# Patient Record
Sex: MALE | Race: WHITE | Employment: UNEMPLOYED | URBAN - METROPOLITAN AREA
[De-identification: names, ages, dates, MRNs, and addresses within clinical notes are randomized per-mention and may not be internally consistent; named-entity substitution may affect disease eponyms.]

---

## 2020-06-29 ENCOUNTER — APPOINTMENT (OUTPATIENT)
Dept: CT IMAGING | Age: 50
End: 2020-06-29
Payer: OTHER GOVERNMENT

## 2020-06-29 ENCOUNTER — HOSPITAL ENCOUNTER (EMERGENCY)
Age: 50
Discharge: HOME | End: 2020-06-29
Payer: OTHER GOVERNMENT

## 2020-06-29 VITALS
TEMPERATURE: 99.3 F | SYSTOLIC BLOOD PRESSURE: 130 MMHG | WEIGHT: 205 LBS | RESPIRATION RATE: 16 BRPM | OXYGEN SATURATION: 97 % | BODY MASS INDEX: 29.35 KG/M2 | DIASTOLIC BLOOD PRESSURE: 89 MMHG | HEART RATE: 78 BPM | HEIGHT: 70 IN

## 2020-06-29 LAB
ALBUMIN SERPL-MCNC: 4.2 G/DL (ref 3.5–4.6)
ALP BLD-CCNC: 88 U/L (ref 35–104)
ALT SERPL-CCNC: 62 U/L (ref 0–41)
ANION GAP SERPL CALCULATED.3IONS-SCNC: 12 MEQ/L (ref 9–15)
AST SERPL-CCNC: 40 U/L (ref 0–40)
BACTERIA: NEGATIVE /HPF
BASOPHILS ABSOLUTE: 0.1 K/UL (ref 0–0.2)
BASOPHILS RELATIVE PERCENT: 0.5 %
BILIRUB SERPL-MCNC: 1.4 MG/DL (ref 0.2–0.7)
BILIRUBIN URINE: NEGATIVE
BLOOD, URINE: ABNORMAL
BUN BLDV-MCNC: 7 MG/DL (ref 6–20)
CALCIUM SERPL-MCNC: 9.4 MG/DL (ref 8.5–9.9)
CHLORIDE BLD-SCNC: 102 MEQ/L (ref 95–107)
CLARITY: CLEAR
CO2: 24 MEQ/L (ref 20–31)
COLOR: YELLOW
CREAT SERPL-MCNC: 0.79 MG/DL (ref 0.7–1.2)
EOSINOPHILS ABSOLUTE: 0.1 K/UL (ref 0–0.7)
EOSINOPHILS RELATIVE PERCENT: 0.4 %
EPITHELIAL CELLS, UA: ABNORMAL /HPF (ref 0–5)
GFR AFRICAN AMERICAN: >60
GFR NON-AFRICAN AMERICAN: >60
GLOBULIN: 2.6 G/DL (ref 2.3–3.5)
GLUCOSE BLD-MCNC: 135 MG/DL (ref 70–99)
GLUCOSE URINE: NEGATIVE MG/DL
HCT VFR BLD CALC: 46.9 % (ref 42–52)
HEMOGLOBIN: 16.3 G/DL (ref 14–18)
HYALINE CASTS: ABNORMAL /HPF (ref 0–5)
KETONES, URINE: NEGATIVE MG/DL
LACTIC ACID: 2.2 MMOL/L (ref 0.5–2.2)
LEUKOCYTE ESTERASE, URINE: NEGATIVE
LIPASE: 19 U/L (ref 12–95)
LYMPHOCYTES ABSOLUTE: 1.7 K/UL (ref 1–4.8)
LYMPHOCYTES RELATIVE PERCENT: 12.8 %
MCH RBC QN AUTO: 31.4 PG (ref 27–31.3)
MCHC RBC AUTO-ENTMCNC: 34.8 % (ref 33–37)
MCV RBC AUTO: 90.3 FL (ref 80–100)
MONOCYTES ABSOLUTE: 0.9 K/UL (ref 0.2–0.8)
MONOCYTES RELATIVE PERCENT: 6.6 %
NEUTROPHILS ABSOLUTE: 10.5 K/UL (ref 1.4–6.5)
NEUTROPHILS RELATIVE PERCENT: 79.7 %
NITRITE, URINE: NEGATIVE
PDW BLD-RTO: 12.5 % (ref 11.5–14.5)
PH UA: 6.5 (ref 5–9)
PLATELET # BLD: 220 K/UL (ref 130–400)
POC CREATININE WHOLE BLOOD: 0.9
POTASSIUM SERPL-SCNC: 3.5 MEQ/L (ref 3.4–4.9)
PROTEIN UA: NEGATIVE MG/DL
RBC # BLD: 5.19 M/UL (ref 4.7–6.1)
RBC UA: ABNORMAL /HPF (ref 0–5)
SODIUM BLD-SCNC: 138 MEQ/L (ref 135–144)
SPECIFIC GRAVITY UA: 1.01 (ref 1–1.03)
TOTAL PROTEIN: 6.8 G/DL (ref 6.3–8)
URINE REFLEX TO CULTURE: ABNORMAL
UROBILINOGEN, URINE: 0.2 E.U./DL
WBC # BLD: 13.1 K/UL (ref 4.8–10.8)
WBC UA: ABNORMAL /HPF (ref 0–5)

## 2020-06-29 PROCEDURE — 96374 THER/PROPH/DIAG INJ IV PUSH: CPT

## 2020-06-29 PROCEDURE — 81001 URINALYSIS AUTO W/SCOPE: CPT

## 2020-06-29 PROCEDURE — 80053 COMPREHEN METABOLIC PANEL: CPT

## 2020-06-29 PROCEDURE — 96375 TX/PRO/DX INJ NEW DRUG ADDON: CPT

## 2020-06-29 PROCEDURE — 2580000003 HC RX 258: Performed by: PHYSICIAN ASSISTANT

## 2020-06-29 PROCEDURE — 6360000004 HC RX CONTRAST MEDICATION: Performed by: PHYSICIAN ASSISTANT

## 2020-06-29 PROCEDURE — 83690 ASSAY OF LIPASE: CPT

## 2020-06-29 PROCEDURE — 6360000002 HC RX W HCPCS: Performed by: PHYSICIAN ASSISTANT

## 2020-06-29 PROCEDURE — 6370000000 HC RX 637 (ALT 250 FOR IP): Performed by: PHYSICIAN ASSISTANT

## 2020-06-29 PROCEDURE — 85025 COMPLETE CBC W/AUTO DIFF WBC: CPT

## 2020-06-29 PROCEDURE — 36415 COLL VENOUS BLD VENIPUNCTURE: CPT

## 2020-06-29 PROCEDURE — 99284 EMERGENCY DEPT VISIT MOD MDM: CPT

## 2020-06-29 PROCEDURE — 74177 CT ABD & PELVIS W/CONTRAST: CPT

## 2020-06-29 PROCEDURE — 83605 ASSAY OF LACTIC ACID: CPT

## 2020-06-29 RX ORDER — METRONIDAZOLE 500 MG/1
500 TABLET ORAL 3 TIMES DAILY
Qty: 30 TABLET | Refills: 0 | Status: SHIPPED | OUTPATIENT
Start: 2020-06-29 | End: 2020-07-09

## 2020-06-29 RX ORDER — CIPROFLOXACIN 500 MG/1
500 TABLET, FILM COATED ORAL ONCE
Status: COMPLETED | OUTPATIENT
Start: 2020-06-29 | End: 2020-06-29

## 2020-06-29 RX ORDER — HYDROCODONE BITARTRATE AND ACETAMINOPHEN 5; 325 MG/1; MG/1
1 TABLET ORAL EVERY 6 HOURS PRN
Qty: 10 TABLET | Refills: 0 | Status: SHIPPED | OUTPATIENT
Start: 2020-06-29 | End: 2020-07-02

## 2020-06-29 RX ORDER — HYDROCODONE BITARTRATE AND ACETAMINOPHEN 5; 325 MG/1; MG/1
1 TABLET ORAL ONCE
Status: COMPLETED | OUTPATIENT
Start: 2020-06-29 | End: 2020-06-29

## 2020-06-29 RX ORDER — METRONIDAZOLE 500 MG/1
500 TABLET ORAL ONCE
Status: COMPLETED | OUTPATIENT
Start: 2020-06-29 | End: 2020-06-29

## 2020-06-29 RX ORDER — 0.9 % SODIUM CHLORIDE 0.9 %
1000 INTRAVENOUS SOLUTION INTRAVENOUS ONCE
Status: COMPLETED | OUTPATIENT
Start: 2020-06-29 | End: 2020-06-29

## 2020-06-29 RX ORDER — ONDANSETRON 4 MG/1
4-8 TABLET, ORALLY DISINTEGRATING ORAL EVERY 12 HOURS PRN
Qty: 12 TABLET | Refills: 0 | Status: SHIPPED | OUTPATIENT
Start: 2020-06-29

## 2020-06-29 RX ORDER — ONDANSETRON 2 MG/ML
4 INJECTION INTRAMUSCULAR; INTRAVENOUS ONCE
Status: COMPLETED | OUTPATIENT
Start: 2020-06-29 | End: 2020-06-29

## 2020-06-29 RX ORDER — CIPROFLOXACIN 500 MG/1
500 TABLET, FILM COATED ORAL 2 TIMES DAILY
Qty: 20 TABLET | Refills: 0 | Status: SHIPPED | OUTPATIENT
Start: 2020-06-29 | End: 2020-07-09

## 2020-06-29 RX ORDER — KETOROLAC TROMETHAMINE 30 MG/ML
30 INJECTION, SOLUTION INTRAMUSCULAR; INTRAVENOUS ONCE
Status: COMPLETED | OUTPATIENT
Start: 2020-06-29 | End: 2020-06-29

## 2020-06-29 RX ADMIN — IOPAMIDOL 100 ML: 612 INJECTION, SOLUTION INTRAVENOUS at 11:04

## 2020-06-29 RX ADMIN — METRONIDAZOLE 500 MG: 500 TABLET, FILM COATED ORAL at 12:43

## 2020-06-29 RX ADMIN — HYDROCODONE BITARTRATE AND ACETAMINOPHEN 1 TABLET: 5; 325 TABLET ORAL at 12:43

## 2020-06-29 RX ADMIN — SODIUM CHLORIDE 1000 ML: 9 INJECTION, SOLUTION INTRAVENOUS at 10:18

## 2020-06-29 RX ADMIN — CIPROFLOXACIN HYDROCHLORIDE 500 MG: 500 TABLET, FILM COATED ORAL at 12:43

## 2020-06-29 RX ADMIN — ONDANSETRON 4 MG: 2 INJECTION INTRAMUSCULAR; INTRAVENOUS at 10:18

## 2020-06-29 RX ADMIN — KETOROLAC TROMETHAMINE 30 MG: 30 INJECTION, SOLUTION INTRAMUSCULAR at 10:18

## 2020-06-29 ASSESSMENT — PAIN DESCRIPTION - LOCATION: LOCATION: ABDOMEN

## 2020-06-29 ASSESSMENT — ENCOUNTER SYMPTOMS
SORE THROAT: 0
BACK PAIN: 0
EYE PAIN: 0
DIARRHEA: 0
COUGH: 0
ABDOMINAL PAIN: 1
SHORTNESS OF BREATH: 0
PHOTOPHOBIA: 0
NAUSEA: 1
RHINORRHEA: 0
VOMITING: 0

## 2020-06-29 ASSESSMENT — PAIN DESCRIPTION - ORIENTATION: ORIENTATION: LEFT;LOWER

## 2020-06-29 ASSESSMENT — PAIN DESCRIPTION - PAIN TYPE
TYPE: ACUTE PAIN
TYPE: ACUTE PAIN

## 2020-06-29 ASSESSMENT — PAIN SCALES - GENERAL
PAINLEVEL_OUTOF10: 6
PAINLEVEL_OUTOF10: 3

## 2020-06-29 NOTE — ED PROVIDER NOTES
Non-medical: None   Tobacco Use    Smoking status: Current Every Day Smoker     Packs/day: 0.50    Smokeless tobacco: Never Used   Substance and Sexual Activity    Alcohol use: Not Currently    Drug use: Never    Sexual activity: None   Lifestyle    Physical activity     Days per week: None     Minutes per session: None    Stress: None   Relationships    Social connections     Talks on phone: None     Gets together: None     Attends Synagogue service: None     Active member of club or organization: None     Attends meetings of clubs or organizations: None     Relationship status: None    Intimate partner violence     Fear of current or ex partner: None     Emotionally abused: None     Physically abused: None     Forced sexual activity: None   Other Topics Concern    None   Social History Narrative    None       SCREENINGS             PHYSICAL EXAM    (up to 7 for level 4, 8 or more for level 5)     ED Triage Vitals [06/29/20 0925]   BP Temp Temp Source Pulse Resp SpO2 Height Weight   (!) 180/120 99.3 °F (37.4 °C) Temporal 111 22 95 % 5' 10\" (1.778 m) 205 lb (93 kg)       Physical Exam  Vitals signs and nursing note reviewed. Constitutional:       General: He is not in acute distress. Appearance: Normal appearance. He is well-developed. He is not diaphoretic. HENT:      Head: Normocephalic and atraumatic. Eyes:      General: Lids are normal.      Conjunctiva/sclera: Conjunctivae normal.   Neck:      Musculoskeletal: Normal range of motion and neck supple. Cardiovascular:      Rate and Rhythm: Normal rate and regular rhythm. Pulses: Normal pulses. Heart sounds: Normal heart sounds. Pulmonary:      Effort: Pulmonary effort is normal.      Breath sounds: Normal breath sounds. Abdominal:      General: Bowel sounds are normal.      Palpations: Abdomen is soft. Tenderness: There is abdominal tenderness in the left lower quadrant. There is no guarding or rebound.    Lymphadenopathy:

## 2020-06-30 LAB
GFR AFRICAN AMERICAN: >60
GFR NON-AFRICAN AMERICAN: >60
PERFORMED ON: NORMAL
POC CREATININE: 0.9 MG/DL (ref 0.9–1.3)
POC SAMPLE TYPE: NORMAL

## 2020-10-16 ENCOUNTER — HOSPITAL ENCOUNTER (EMERGENCY)
Age: 50
Discharge: HOME | End: 2020-10-16
Payer: OTHER GOVERNMENT

## 2020-10-16 ENCOUNTER — APPOINTMENT (OUTPATIENT)
Dept: GENERAL RADIOLOGY | Age: 50
End: 2020-10-16
Payer: OTHER GOVERNMENT

## 2020-10-16 VITALS
RESPIRATION RATE: 21 BRPM | BODY MASS INDEX: 29.35 KG/M2 | OXYGEN SATURATION: 96 % | SYSTOLIC BLOOD PRESSURE: 148 MMHG | HEART RATE: 83 BPM | HEIGHT: 70 IN | TEMPERATURE: 99.3 F | DIASTOLIC BLOOD PRESSURE: 95 MMHG | WEIGHT: 205 LBS

## 2020-10-16 LAB
ALBUMIN SERPL-MCNC: 4.3 G/DL (ref 3.5–4.6)
ALP BLD-CCNC: 74 U/L (ref 35–104)
ALT SERPL-CCNC: 32 U/L (ref 0–41)
ANION GAP SERPL CALCULATED.3IONS-SCNC: 12 MEQ/L (ref 9–15)
AST SERPL-CCNC: 43 U/L (ref 0–40)
BASOPHILS ABSOLUTE: 0 K/UL (ref 0–0.2)
BASOPHILS RELATIVE PERCENT: 0.7 %
BILIRUB SERPL-MCNC: 0.7 MG/DL (ref 0.2–0.7)
BUN BLDV-MCNC: 8 MG/DL (ref 6–20)
CALCIUM SERPL-MCNC: 9.1 MG/DL (ref 8.5–9.9)
CHLORIDE BLD-SCNC: 103 MEQ/L (ref 95–107)
CO2: 24 MEQ/L (ref 20–31)
CREAT SERPL-MCNC: 0.87 MG/DL (ref 0.7–1.2)
EKG ATRIAL RATE: 96 BPM
EKG P AXIS: 31 DEGREES
EKG P-R INTERVAL: 158 MS
EKG Q-T INTERVAL: 368 MS
EKG QRS DURATION: 96 MS
EKG QTC CALCULATION (BAZETT): 464 MS
EKG R AXIS: 9 DEGREES
EKG T AXIS: 52 DEGREES
EKG VENTRICULAR RATE: 96 BPM
EOSINOPHILS ABSOLUTE: 0.1 K/UL (ref 0–0.7)
EOSINOPHILS RELATIVE PERCENT: 2.2 %
GFR AFRICAN AMERICAN: >60
GFR NON-AFRICAN AMERICAN: >60
GLOBULIN: 2.8 G/DL (ref 2.3–3.5)
GLUCOSE BLD-MCNC: 142 MG/DL (ref 70–99)
HCT VFR BLD CALC: 44.7 % (ref 42–52)
HEMOGLOBIN: 15.6 G/DL (ref 14–18)
LYMPHOCYTES ABSOLUTE: 2.1 K/UL (ref 1–4.8)
LYMPHOCYTES RELATIVE PERCENT: 32.1 %
MAGNESIUM: 2.1 MG/DL (ref 1.7–2.4)
MCH RBC QN AUTO: 31.5 PG (ref 27–31.3)
MCHC RBC AUTO-ENTMCNC: 35 % (ref 33–37)
MCV RBC AUTO: 90.1 FL (ref 80–100)
MONOCYTES ABSOLUTE: 0.5 K/UL (ref 0.2–0.8)
MONOCYTES RELATIVE PERCENT: 7.3 %
NEUTROPHILS ABSOLUTE: 3.8 K/UL (ref 1.4–6.5)
NEUTROPHILS RELATIVE PERCENT: 57.7 %
PDW BLD-RTO: 12.7 % (ref 11.5–14.5)
PLATELET # BLD: 234 K/UL (ref 130–400)
POTASSIUM SERPL-SCNC: 4.8 MEQ/L (ref 3.4–4.9)
RBC # BLD: 4.97 M/UL (ref 4.7–6.1)
SARS-COV-2, NAAT: NOT DETECTED
SODIUM BLD-SCNC: 139 MEQ/L (ref 135–144)
TOTAL PROTEIN: 7.1 G/DL (ref 6.3–8)
TROPONIN: <0.01 NG/ML (ref 0–0.01)
WBC # BLD: 6.6 K/UL (ref 4.8–10.8)

## 2020-10-16 PROCEDURE — U0002 COVID-19 LAB TEST NON-CDC: HCPCS

## 2020-10-16 PROCEDURE — 93005 ELECTROCARDIOGRAM TRACING: CPT | Performed by: NURSE PRACTITIONER

## 2020-10-16 PROCEDURE — 84484 ASSAY OF TROPONIN QUANT: CPT

## 2020-10-16 PROCEDURE — 36415 COLL VENOUS BLD VENIPUNCTURE: CPT

## 2020-10-16 PROCEDURE — 85025 COMPLETE CBC W/AUTO DIFF WBC: CPT

## 2020-10-16 PROCEDURE — 71045 X-RAY EXAM CHEST 1 VIEW: CPT

## 2020-10-16 PROCEDURE — 80053 COMPREHEN METABOLIC PANEL: CPT

## 2020-10-16 PROCEDURE — 83735 ASSAY OF MAGNESIUM: CPT

## 2020-10-16 PROCEDURE — 99283 EMERGENCY DEPT VISIT LOW MDM: CPT

## 2020-10-16 RX ORDER — PREDNISONE 20 MG/1
40 TABLET ORAL DAILY
Qty: 20 TABLET | Refills: 0 | Status: SHIPPED | OUTPATIENT
Start: 2020-10-16 | End: 2020-10-26

## 2020-10-16 ASSESSMENT — ENCOUNTER SYMPTOMS
COLOR CHANGE: 0
NAUSEA: 0
COUGH: 1
EYE PAIN: 0
EYE REDNESS: 0
TROUBLE SWALLOWING: 0
SORE THROAT: 0
BACK PAIN: 0
SHORTNESS OF BREATH: 0
VOMITING: 0
WHEEZING: 0
BLOOD IN STOOL: 0
DIARRHEA: 0
EYE DISCHARGE: 0
CONSTIPATION: 0
RHINORRHEA: 0
ABDOMINAL PAIN: 0

## 2020-10-16 NOTE — ED TRIAGE NOTES
Recently worked as , Wednesday night developed cough, this morning developed chest pain which woke him from sleep. Pain is pressure and sharp, worse with coughing, increased coughing, pain and shortness of breath when laying down. Took tylenol for temp of 100.1 at home at 0130.

## 2020-10-16 NOTE — ED NOTES
Patient given DC instructions education and scripts  IV discontinued  Patient to FU with PCP   Up with steady gait at time of [address], RN  10/16/20 Vinny 11, RN  10/16/20 7614

## 2020-10-16 NOTE — ED PROVIDER NOTES
[address] [facility] ED  EMERGENCY DEPARTMENT ENCOUNTER      Pt Name: Emanuel Levine  MRN: [id number]  [place] 1970  Date of evaluation: 10/16/2020  Provider: CHERYL Phipps CNP    CHIEF COMPLAINT       Chief Complaint   Patient presents with    Chest Pain         HISTORY OF PRESENT ILLNESS   (Location/Symptom, Timing/Onset,Context/Setting, Quality, Duration, Modifying Factors, Severity)  Note limiting factors. Emanuel Levine is a 53 y.o. male who presents to the emergency department with a chart reviewed past medical history of Landry's esophagus and seizures for chief complaint of fever and chest pain. Patient reports that yesterday he started experiencing midsternal chest tightness and pressure rated 6 out of 10 and he noted that he was experiencing 800.5 fever at the same time. He took some Tylenol which helped take down his fever. He denies any shortness of breath at this time but he states that with activity his breathing is about the same. He has been having a productive cough with clear phlegm. He denies having any nausea or vomiting. Denies loss of taste or smell. Reports exposure to Covid because he is a  and he has been going in person and exposed to students that may have tested positive. Nursing Notes were reviewed. REVIEW OF SYSTEMS    (2-9 systems for level 4, 10 or more for level 5)     Review of Systems   Constitutional: Negative for activity change, appetite change, fatigue and fever. HENT: Negative for congestion, ear pain, rhinorrhea, sore throat and trouble swallowing. Eyes: Negative for pain, discharge and redness. Respiratory: Positive for cough. Negative for shortness of breath and wheezing. Cardiovascular: Positive for chest pain. Negative for palpitations. Gastrointestinal: Negative for abdominal pain, blood in stool, constipation, diarrhea, nausea and vomiting. Endocrine: Negative for polydipsia and polyuria.    Genitourinary: Negative for decreased urine volume, dysuria, flank pain and hematuria. Musculoskeletal: Negative for arthralgias, back pain and myalgias. Skin: Negative for color change, rash and wound. Neurological: Negative for dizziness, syncope, weakness, light-headedness and headaches. Psychiatric/Behavioral: Negative for behavioral problems. All other systems reviewed and are negative. Except as noted above the remainder of the review of systems was reviewed and negative. PAST MEDICAL HISTORY     Past Medical History:   Diagnosis Date    Landry esophagus     Seizures ([facility] [address]      No past surgical history on file.   Social History     Socioeconomic History    Marital status:      Spouse name: Not on file    Number of children: Not on file    Years of education: Not on file    Highest education level: Not on file   Occupational History    Not on file   Social Needs    Financial resource strain: Not on file    Food insecurity     Worry: Not on file     Inability: Not on file    Transportation needs     Medical: Not on file     Non-medical: Not on file   Tobacco Use    Smoking status: Current Every Day Smoker     Packs/day: 0.50    Smokeless tobacco: Never Used   Substance and Sexual Activity    Alcohol use: Not Currently    Drug use: Never    Sexual activity: Not on file   Lifestyle    Physical activity     Days per week: Not on file     Minutes per session: Not on file    Stress: Not on file   Relationships    Social connections     Talks on phone: Not on file     Gets together: Not on file     Attends Yazidi service: Not on file     Active member of club or organization: Not on file     Attends meetings of clubs or organizations: Not on file     Relationship status: Not on file    Intimate partner violence     Fear of current or ex partner: Not on file     Emotionally abused: Not on file     Physically abused: Not on file     Forced sexual activity: Not on file   Other Topics Concern    Not on file   Social History Narrative    Not on file       SCREENINGS    [place] Coma Scale  Eye Opening: Spontaneous  Best Verbal Response: Oriented  Best Motor Response: Obeys commands  [place] Coma Scale Score: 15        PHYSICAL EXAM    (up to 7 for level 4, 8 or more for level 5)     ED Triage Vitals [10/16/20 0619]   BP Temp Temp Source Pulse Resp SpO2 Height Weight   (!) 182/101 99.3 °F (37.4 °C) Oral 94 22 97 % 5' 10\" (1.778 m) 205 lb (93 kg)       Physical Exam  Vitals signs and nursing note reviewed. Constitutional:       General: He is not in acute distress. Appearance: He is well-developed. He is not diaphoretic. HENT:      Head: Normocephalic and atraumatic. Nose: Nose normal.   Eyes:      Conjunctiva/sclera: Conjunctivae normal.      Pupils: Pupils are equal, round, and reactive to light. Neck:      Musculoskeletal: Normal range of motion and neck supple. Cardiovascular:      Rate and Rhythm: Normal rate and regular rhythm. Heart sounds: Normal heart sounds. Pulmonary:      Effort: Pulmonary effort is normal. No respiratory distress. Breath sounds: Normal breath sounds. Abdominal:      General: Bowel sounds are normal.      Palpations: Abdomen is soft. Tenderness: There is no abdominal tenderness. Skin:     General: Skin is warm and dry. Capillary Refill: Capillary refill takes less than 2 seconds. Findings: No rash. Neurological:      Mental Status: He is alert and oriented to person, place, and time. Cranial Nerves: No cranial nerve deficit. Psychiatric:         Behavior: Behavior normal.         RESULTS     EKG: All EKG's are interpreted by the Emergency Department Physician who either signs or Co-signsthis chart in the absence of a cardiologist.    Normal sinus rhythm rate of 96 bpm.  No ST elevations. No ectopy.     RADIOLOGY:   Non-plain filmimages such as CT, Ultrasound and MRI are read by the radiologist. Plain radiographic images are visualized and preliminarily interpreted by the emergency physician with the below findings:    NAD    Interpretation per the Radiologist below, if available at the time ofthis note:    XR CHEST PORTABLE   Final Result   No radiographic evidence of acute intrathoracic process. ED BEDSIDE ULTRASOUND:   Performed by ED Physician - none    LABS:  Labs Reviewed   COMPREHENSIVE METABOLIC PANEL - Abnormal; Notable for the following components:       Result Value    Glucose 142 (*)     AST 43 (*)     All other components within normal limits   CBC WITH AUTO DIFFERENTIAL - Abnormal; Notable for the following components:    MCH 31.5 (*)     All other components within normal limits   MAGNESIUM   TROPONIN   COVID-19       All other labs were within normal range or not returned as of this dictation. EMERGENCY DEPARTMENT COURSE and DIFFERENTIAL DIAGNOSIS/MDM:   Vitals:    Vitals:    10/16/20 0630 10/16/20 0700 10/16/20 0715 10/16/20 0800   BP: (!) 160/113 (!) 168/103 (!) 157/103 (!) 148/95   Pulse: 92 89 89 83   Resp: 15 21 15 21   Temp:       TempSrc:       SpO2: 95% 95% 96% 96%   Weight:       Height:                MDM   Patient is a 67-year-old male presenting to the ER with chief complaint of chest pain. Patient is hemodynamically stable nontoxic-appearing. 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 Covid is negative. Chest x-ray unremarkable. Lab work unremarkable. Patient is instructed to follow-up with his primary care doctor he likely has a respiratory illness. He is given a prescription for prednisone instructed to come back to the ER if he worsens. Discharged in stable condition stable vital signs.     CRITICAL CARE TIME       CONSULTS:  None    PROCEDURES:  Unless otherwise noted below, none Procedures    FINAL IMPRESSION      1.  Upper respiratory tract infection, unspecified type          DISPOSITION/PLAN   DISPOSITION Decision To Discharge 10/16/2020 08:24:43 AM      PATIENT REFERRED TO:  Kat Barboza MD  [address] [facility] [address]  [phone]    Schedule an appointment as soon as possible for a visit in 1 day        DISCHARGE MEDICATIONS:  New Prescriptions    PREDNISONE (DELTASONE) 20 MG TABLET    Take 2 tablets by mouth daily for 10 days          (Please notethat portions of this note were completed with a voice recognition program.  Efforts were made to edit the dictations but occasionally words are mis-transcribed.)    Frankie Bonilla, CHERYL Reagan CNP (electronically signed)  Attending Emergency Physician          [facility], CHERYL - CNP  10/16/20 0727

## 2020-10-19 PROCEDURE — 93010 ELECTROCARDIOGRAM REPORT: CPT | Performed by: INTERNAL MEDICINE

## 2021-01-29 ENCOUNTER — HOSPITAL ENCOUNTER (EMERGENCY)
Age: 51
Discharge: HOME | End: 2021-01-29

## 2021-01-29 ENCOUNTER — APPOINTMENT (OUTPATIENT)
Dept: GENERAL RADIOLOGY | Age: 51
End: 2021-01-29

## 2021-01-29 VITALS
TEMPERATURE: 99.6 F | DIASTOLIC BLOOD PRESSURE: 82 MMHG | RESPIRATION RATE: 18 BRPM | WEIGHT: 223 LBS | BODY MASS INDEX: 31.92 KG/M2 | HEIGHT: 70 IN | OXYGEN SATURATION: 93 % | SYSTOLIC BLOOD PRESSURE: 119 MMHG | HEART RATE: 80 BPM

## 2021-01-29 DIAGNOSIS — T88.7XXA MEDICATION SIDE EFFECT: ICD-10-CM

## 2021-01-29 DIAGNOSIS — J18.0 BRONCHIAL PNEUMONIA: Primary | ICD-10-CM

## 2021-01-29 LAB
ALBUMIN SERPL-MCNC: 4.1 G/DL (ref 3.5–4.6)
ALP BLD-CCNC: 89 U/L (ref 35–104)
ALT SERPL-CCNC: 29 U/L (ref 0–41)
ANION GAP SERPL CALCULATED.3IONS-SCNC: 14 MEQ/L (ref 9–15)
AST SERPL-CCNC: 21 U/L (ref 0–40)
BASOPHILS ABSOLUTE: 0 K/UL (ref 0–0.2)
BASOPHILS RELATIVE PERCENT: 0.2 %
BILIRUB SERPL-MCNC: 0.9 MG/DL (ref 0.2–0.7)
BUN BLDV-MCNC: 8 MG/DL (ref 6–20)
CALCIUM SERPL-MCNC: 8.8 MG/DL (ref 8.5–9.9)
CHLORIDE BLD-SCNC: 103 MEQ/L (ref 95–107)
CK MB: 1.5 NG/ML (ref 0–6.7)
CO2: 24 MEQ/L (ref 20–31)
CREAT SERPL-MCNC: 1.05 MG/DL (ref 0.7–1.2)
CREATINE KINASE-MB INDEX: 0.8 % (ref 0–3.5)
EKG ATRIAL RATE: 102 BPM
EKG P AXIS: 48 DEGREES
EKG P-R INTERVAL: 150 MS
EKG Q-T INTERVAL: 344 MS
EKG QRS DURATION: 96 MS
EKG QTC CALCULATION (BAZETT): 448 MS
EKG R AXIS: 38 DEGREES
EKG T AXIS: 47 DEGREES
EKG VENTRICULAR RATE: 102 BPM
EOSINOPHILS ABSOLUTE: 0.1 K/UL (ref 0–0.7)
EOSINOPHILS RELATIVE PERCENT: 1.2 %
GFR AFRICAN AMERICAN: >60
GFR NON-AFRICAN AMERICAN: >60
GLOBULIN: 2.2 G/DL (ref 2.3–3.5)
GLUCOSE BLD-MCNC: 133 MG/DL (ref 70–99)
HCT VFR BLD CALC: 46.1 % (ref 42–52)
HEMOGLOBIN: 16.2 G/DL (ref 14–18)
LYMPHOCYTES ABSOLUTE: 0.7 K/UL (ref 1–4.8)
LYMPHOCYTES RELATIVE PERCENT: 16.8 %
MAGNESIUM: 2 MG/DL (ref 1.7–2.4)
MCH RBC QN AUTO: 32.1 PG (ref 27–31.3)
MCHC RBC AUTO-ENTMCNC: 35.2 % (ref 33–37)
MCV RBC AUTO: 91.2 FL (ref 80–100)
MONOCYTES ABSOLUTE: 0 K/UL (ref 0.2–0.8)
MONOCYTES RELATIVE PERCENT: 0.4 %
NEUTROPHILS ABSOLUTE: 3.6 K/UL (ref 1.4–6.5)
NEUTROPHILS RELATIVE PERCENT: 81.4 %
PDW BLD-RTO: 12.5 % (ref 11.5–14.5)
PLATELET # BLD: 170 K/UL (ref 130–400)
POTASSIUM SERPL-SCNC: 3.4 MEQ/L (ref 3.4–4.9)
RBC # BLD: 5.05 M/UL (ref 4.7–6.1)
SARS-COV-2, NAAT: NOT DETECTED
SODIUM BLD-SCNC: 141 MEQ/L (ref 135–144)
TOTAL CK: 195 U/L (ref 0–190)
TOTAL PROTEIN: 6.3 G/DL (ref 6.3–8)
WBC # BLD: 4.4 K/UL (ref 4.8–10.8)

## 2021-01-29 PROCEDURE — U0002 COVID-19 LAB TEST NON-CDC: HCPCS

## 2021-01-29 PROCEDURE — 82550 ASSAY OF CK (CPK): CPT

## 2021-01-29 PROCEDURE — 99285 EMERGENCY DEPT VISIT HI MDM: CPT

## 2021-01-29 PROCEDURE — 6370000000 HC RX 637 (ALT 250 FOR IP): Performed by: PHYSICIAN ASSISTANT

## 2021-01-29 PROCEDURE — 36415 COLL VENOUS BLD VENIPUNCTURE: CPT

## 2021-01-29 PROCEDURE — 85025 COMPLETE CBC W/AUTO DIFF WBC: CPT

## 2021-01-29 PROCEDURE — 71045 X-RAY EXAM CHEST 1 VIEW: CPT

## 2021-01-29 PROCEDURE — 80053 COMPREHEN METABOLIC PANEL: CPT

## 2021-01-29 PROCEDURE — 93005 ELECTROCARDIOGRAM TRACING: CPT | Performed by: PHYSICIAN ASSISTANT

## 2021-01-29 PROCEDURE — 83735 ASSAY OF MAGNESIUM: CPT

## 2021-01-29 PROCEDURE — 82553 CREATINE MB FRACTION: CPT

## 2021-01-29 RX ORDER — ACETAMINOPHEN 500 MG
1000 TABLET ORAL ONCE
Status: COMPLETED | OUTPATIENT
Start: 2021-01-29 | End: 2021-01-29

## 2021-01-29 RX ORDER — DOXYCYCLINE HYCLATE 100 MG
100 TABLET ORAL 2 TIMES DAILY
Qty: 20 TABLET | Refills: 0 | Status: SHIPPED | OUTPATIENT
Start: 2021-01-29 | End: 2021-02-08

## 2021-01-29 RX ORDER — DOXYCYCLINE HYCLATE 100 MG/1
100 CAPSULE ORAL ONCE
Status: COMPLETED | OUTPATIENT
Start: 2021-01-29 | End: 2021-01-29

## 2021-01-29 RX ADMIN — DOXYCYCLINE HYCLATE 100 MG: 100 CAPSULE ORAL at 20:43

## 2021-01-29 RX ADMIN — ACETAMINOPHEN 1000 MG: 500 TABLET ORAL at 17:25

## 2021-01-29 ASSESSMENT — ENCOUNTER SYMPTOMS
ABDOMINAL PAIN: 0
NAUSEA: 0
VOMITING: 0
SHORTNESS OF BREATH: 0
CONSTIPATION: 0
COLOR CHANGE: 0
ABDOMINAL DISTENTION: 0
SORE THROAT: 0
DIARRHEA: 0
RHINORRHEA: 0
EYE DISCHARGE: 0

## 2021-01-29 ASSESSMENT — PAIN SCALES - GENERAL: PAINLEVEL_OUTOF10: 0

## 2021-01-29 NOTE — ED NOTES
Bed: 06  Expected date:   Expected time:   Means of arrival:   Comments:  50m dizziness     Andrei Gallegos, MADDISON  01/29/21 0477

## 2021-01-29 NOTE — ED PROVIDER NOTES
[address] [facility] ED  eMERGENCY dEPARTMENT eNCOUnter      Pt Name: Severo Dugan  MRN: [id number]  [place] 1970  Date of evaluation: 1/29/2021  Provider: Ana M Ornelas Dr       Chief Complaint   Patient presents with    Fever     DIZZY TIMES THREE DAYS          HISTORY OF PRESENT ILLNESS   (Location/Symptom, Timing/Onset,Context/Setting, Quality, Duration, Modifying Factors, Severity)  Note limiting factors. Severo Dugan is a 53 y.o. male who presents to the emergency department with a complaint of dizziness which patient states is intermittently occurring over the last 3 days. Patient states he started a new blood pressure medication, lisinopril, hydrochlorothiazide, he states whenever he takes his medication 20 minutes afterwards he states he feels weak, dizzy, breaks out in a sweat, he states this last maybe an hour or so and then seemed to resolve, he does not have any other issues throughout the day, he states he has no other side effects from medications,  no shortness of breath, no rashes or itching. Denies any pain at this time, no dizziness currently. HPI    NursingNotes were reviewed. REVIEW OF SYSTEMS    (2-9 systems for level 4, 10 or more for level 5)     Review of Systems   Constitutional: Positive for chills and fatigue. Negative for activity change and appetite change. HENT: Negative for congestion, ear discharge, ear pain, nosebleeds, rhinorrhea and sore throat. Eyes: Negative for discharge. Respiratory: Negative for shortness of breath. Cardiovascular: Negative for chest pain, palpitations and leg swelling. Gastrointestinal: Negative for abdominal distention, abdominal pain, constipation, diarrhea, nausea and vomiting. Genitourinary: Negative for difficulty urinating and dysuria. Musculoskeletal: Negative for arthralgias. Skin: Negative for color change, pallor, rash and wound. Neurological: Positive for dizziness.  Negative for tremors, syncope, weakness, numbness and headaches. Psychiatric/Behavioral: Negative for agitation and confusion. Except as noted above the remainder of the review of systems was reviewed and negative. PAST MEDICAL HISTORY     Past Medical History:   Diagnosis Date    Landry esophagus     Seizures ([facility] [address]          SURGICALHISTORY     History reviewed. No pertinent surgical history. CURRENT MEDICATIONS       Discharge Medication List as of 1/29/2021  8:29 PM      CONTINUE these medications which have NOT CHANGED    Details   ondansetron (ZOFRAN ODT) 4 MG disintegrating tablet Take 1-2 tablets by mouth every 12 hours as needed for Nausea May Sub regular tablet (non-ODT) if insurance does not cover ODT., Disp-12 tablet, R-0Print             ALLERGIES     Pcn [penicillins]    FAMILY HISTORY     History reviewed. No pertinent family history.        SOCIAL HISTORY       Social History     Socioeconomic History    Marital status:      Spouse name: None    Number of children: None    Years of education: None    Highest education level: None   Occupational History    None   Social Needs    Financial resource strain: None    Food insecurity     Worry: None     Inability: None    Transportation needs     Medical: None     Non-medical: None   Tobacco Use    Smoking status: Current Every Day Smoker     Packs/day: 0.50    Smokeless tobacco: Never Used   Substance and Sexual Activity    Alcohol use: Not Currently    Drug use: Never    Sexual activity: None   Lifestyle    Physical activity     Days per week: None     Minutes per session: None    Stress: None   Relationships    Social connections     Talks on phone: None     Gets together: None     Attends Lutheran service: None     Active member of club or organization: None     Attends meetings of clubs or organizations: None     Relationship status: None    Intimate partner violence     Fear of current or ex partner: None Emotionally abused: None     Physically abused: None     Forced sexual activity: None   Other Topics Concern    None   Social History Narrative    None       SCREENINGS    [place] Coma Scale  Eye Opening: Spontaneous  Best Verbal Response: Oriented  Best Motor Response: Obeys commands  [place] Coma Scale Score: 15 @FLOW([id number])@      PHYSICAL EXAM    (up to 7 for level 4, 8 or more for level 5)     ED Triage Vitals [01/29/21 1656]   BP Temp Temp Source Pulse Resp SpO2 Height Weight   (!) 146/99 101.8 °F (38.8 °C) Oral 105 18 95 % 5' 10\" (1.778 m) 223 lb (101.2 kg)       Physical Exam  Vitals signs and nursing note reviewed. Constitutional:       General: He is not in acute distress. Appearance: Normal appearance. He is well-developed. He is not ill-appearing, toxic-appearing or diaphoretic. HENT:      Head: Normocephalic. Right Ear: Tympanic membrane normal.      Left Ear: Tympanic membrane normal.      Nose: Nose normal. No congestion. Mouth/Throat:      Mouth: Mucous membranes are moist.      Pharynx: No oropharyngeal exudate or posterior oropharyngeal erythema. Eyes:      Extraocular Movements: Extraocular movements intact. Conjunctiva/sclera: Conjunctivae normal.      Pupils: Pupils are equal, round, and reactive to light. Neck:      Musculoskeletal: Normal range of motion and neck supple. No neck rigidity. Vascular: No JVD. Trachea: No tracheal deviation. Cardiovascular:      Rate and Rhythm: Normal rate. Pulses: Normal pulses. Heart sounds: Normal heart sounds. No murmur. No friction rub. No gallop. Pulmonary:      Effort: Pulmonary effort is normal. No tachypnea, accessory muscle usage, respiratory distress or retractions. Breath sounds: No stridor. No wheezing, rhonchi or rales. Chest:      Chest wall: No tenderness. Abdominal:      General: Abdomen is flat. Bowel sounds are normal. There is no distension or abdominal bruit.       Palpations: Abdomen is soft. There is no shifting dullness, fluid wave, hepatomegaly, splenomegaly, mass or pulsatile mass. Tenderness: There is no abdominal tenderness. There is no right CVA tenderness, left CVA tenderness, guarding or rebound. Negative signs include Jones's sign, Rovsing's sign and McBurney's sign. Musculoskeletal: Normal range of motion. General: No deformity. Skin:     General: Skin is warm and dry. Capillary Refill: Capillary refill takes less than 2 seconds. Coloration: Skin is not jaundiced. Neurological:      General: No focal deficit present. Mental Status: He is alert and oriented to person, place, and time. Mental status is at baseline. Cranial Nerves: No cranial nerve deficit. Sensory: No sensory deficit. Motor: No weakness. Coordination: Coordination normal.   Psychiatric:         Mood and Affect: Mood normal.         DIAGNOSTIC RESULTS     EKG: All EKG's are interpreted by the Emergency Department Physician who either signs or Co-signsthis chart in the absence of a cardiologist.    EKG shows sinus tachycardia at 102 bpm there is no acute ST segment abnormality no ventricular ectopy QTC is 448 ms    RADIOLOGY:   Non-plain filmimages such as CT, Ultrasound and MRI are read by the radiologist. Plain radiographic images are visualized and preliminarily interpreted by the emergency physician with the below findings:        Interpretation per the Radiologist below, if available at the time ofthis note:    XR CHEST PORTABLE   Final Result      POOR INSPIRATION WITH MILD PROBABLE BIBASILAR ATELECTASIS. BRONCHOPNEUMONIA SHOULD BE EXCLUDED CLINICALLY.                               ED BEDSIDE ULTRASOUND:   Performed by ED Physician - none    LABS:  Labs Reviewed   COMPREHENSIVE METABOLIC PANEL - Abnormal; Notable for the following components:       Result Value    Glucose 133 (*)     Total Bilirubin 0.9 (*)     Globulin 2.2 (*)     All other minutes, excluding separately reportableprocedures. There was a high probability of clinicallysignificant/life threatening deterioration in the patient's condition which required my urgent intervention. CONSULTS:  None    PROCEDURES:  Unless otherwise noted below, none     Procedures    FINAL IMPRESSION      1. Bronchial pneumonia    2.  Medication side effect          DISPOSITION/PLAN   DISPOSITION Decision To Discharge 01/29/2021 08:53:51 PM      PATIENT REFERRED TO:  [facility]    Schedule an appointment as soon as possible for a visit in 2 days        DISCHARGE MEDICATIONS:  Discharge Medication List as of 1/29/2021  8:29 PM             (Please note that portions of this note were completed with a voice recognition program.  Efforts were made to edit the dictations but occasionally words are mis-transcribed.)    Dhara Gomez PA-C (electronically signed)  Attending Emergency Physician         Dhara Gomez PA-C  01/29/21 7847

## 2021-02-01 PROCEDURE — 93010 ELECTROCARDIOGRAM REPORT: CPT | Performed by: INTERNAL MEDICINE

## 2023-02-23 VITALS
HEART RATE: 111 BPM | SYSTOLIC BLOOD PRESSURE: 144 MMHG | TEMPERATURE: 96.8 F | DIASTOLIC BLOOD PRESSURE: 92 MMHG | RESPIRATION RATE: 20 BRPM | OXYGEN SATURATION: 100 %

## 2023-03-07 LAB — SARS-COV-2 RESULT: DETECTED

## 2023-11-10 ENCOUNTER — LAB REQUISITION (OUTPATIENT)
Dept: LAB | Facility: HOSPITAL | Age: 53
End: 2023-11-10

## 2023-11-10 DIAGNOSIS — Z11.52 ENCOUNTER FOR SCREENING FOR COVID-19: ICD-10-CM

## 2023-11-10 LAB — SARS-COV-2 RNA RESP QL NAA+PROBE: NOT DETECTED

## 2023-11-10 PROCEDURE — 87635 SARS-COV-2 COVID-19 AMP PRB: CPT

## 2024-02-29 ENCOUNTER — LAB REQUISITION (OUTPATIENT)
Dept: LAB | Facility: HOSPITAL | Age: 54
End: 2024-02-29

## 2024-02-29 LAB — SARS-COV-2 RNA RESP QL NAA+PROBE: NOT DETECTED

## 2024-02-29 PROCEDURE — 87635 SARS-COV-2 COVID-19 AMP PRB: CPT

## 2024-06-03 ENCOUNTER — APPOINTMENT (OUTPATIENT)
Dept: CARDIOLOGY | Facility: HOSPITAL | Age: 54
End: 2024-06-03
Payer: OTHER GOVERNMENT

## 2024-06-03 ENCOUNTER — APPOINTMENT (OUTPATIENT)
Dept: RADIOLOGY | Facility: HOSPITAL | Age: 54
End: 2024-06-03
Payer: OTHER GOVERNMENT

## 2024-06-03 ENCOUNTER — HOSPITAL ENCOUNTER (EMERGENCY)
Facility: HOSPITAL | Age: 54
Discharge: HOME | End: 2024-06-03
Attending: EMERGENCY MEDICINE
Payer: OTHER GOVERNMENT

## 2024-06-03 VITALS
WEIGHT: 217 LBS | BODY MASS INDEX: 31.07 KG/M2 | SYSTOLIC BLOOD PRESSURE: 178 MMHG | HEART RATE: 52 BPM | TEMPERATURE: 98.1 F | OXYGEN SATURATION: 96 % | HEIGHT: 70 IN | DIASTOLIC BLOOD PRESSURE: 95 MMHG | RESPIRATION RATE: 16 BRPM

## 2024-06-03 DIAGNOSIS — R51.9 ACUTE NONINTRACTABLE HEADACHE, UNSPECIFIED HEADACHE TYPE: ICD-10-CM

## 2024-06-03 DIAGNOSIS — E87.6 HYPOKALEMIA: ICD-10-CM

## 2024-06-03 DIAGNOSIS — I16.0 HYPERTENSIVE URGENCY: Primary | ICD-10-CM

## 2024-06-03 LAB
ALBUMIN SERPL BCP-MCNC: 4.1 G/DL (ref 3.4–5)
ALP SERPL-CCNC: 74 U/L (ref 33–120)
ALT SERPL W P-5'-P-CCNC: 33 U/L (ref 10–52)
ANION GAP SERPL CALC-SCNC: 11 MMOL/L (ref 10–20)
AST SERPL W P-5'-P-CCNC: 24 U/L (ref 9–39)
BASOPHILS # BLD AUTO: 0.05 X10*3/UL (ref 0–0.1)
BASOPHILS NFR BLD AUTO: 0.6 %
BILIRUB SERPL-MCNC: 0.7 MG/DL (ref 0–1.2)
BUN SERPL-MCNC: 10 MG/DL (ref 6–23)
CALCIUM SERPL-MCNC: 9.3 MG/DL (ref 8.6–10.3)
CARDIAC TROPONIN I PNL SERPL HS: 12 NG/L (ref 0–20)
CARDIAC TROPONIN I PNL SERPL HS: 13 NG/L (ref 0–20)
CHLORIDE SERPL-SCNC: 103 MMOL/L (ref 98–107)
CO2 SERPL-SCNC: 31 MMOL/L (ref 21–32)
CREAT SERPL-MCNC: 0.97 MG/DL (ref 0.5–1.3)
EGFRCR SERPLBLD CKD-EPI 2021: >90 ML/MIN/1.73M*2
EOSINOPHIL # BLD AUTO: 0.22 X10*3/UL (ref 0–0.7)
EOSINOPHIL NFR BLD AUTO: 2.6 %
ERYTHROCYTE [DISTWIDTH] IN BLOOD BY AUTOMATED COUNT: 12.1 % (ref 11.5–14.5)
GLUCOSE SERPL-MCNC: 103 MG/DL (ref 74–99)
HCT VFR BLD AUTO: 36.9 % (ref 41–52)
HGB BLD-MCNC: 13.6 G/DL (ref 13.5–17.5)
IMM GRANULOCYTES # BLD AUTO: 0.04 X10*3/UL (ref 0–0.7)
IMM GRANULOCYTES NFR BLD AUTO: 0.5 % (ref 0–0.9)
LYMPHOCYTES # BLD AUTO: 3.02 X10*3/UL (ref 1.2–4.8)
LYMPHOCYTES NFR BLD AUTO: 35.8 %
MAGNESIUM SERPL-MCNC: 2.02 MG/DL (ref 1.6–2.4)
MCH RBC QN AUTO: 32.4 PG (ref 26–34)
MCHC RBC AUTO-ENTMCNC: 36.9 G/DL (ref 32–36)
MCV RBC AUTO: 88 FL (ref 80–100)
MONOCYTES # BLD AUTO: 0.62 X10*3/UL (ref 0.1–1)
MONOCYTES NFR BLD AUTO: 7.3 %
NEUTROPHILS # BLD AUTO: 4.49 X10*3/UL (ref 1.2–7.7)
NEUTROPHILS NFR BLD AUTO: 53.2 %
NRBC BLD-RTO: 0 /100 WBCS (ref 0–0)
PLATELET # BLD AUTO: 235 X10*3/UL (ref 150–450)
POTASSIUM SERPL-SCNC: 2.8 MMOL/L (ref 3.5–5.3)
PROT SERPL-MCNC: 6.2 G/DL (ref 6.4–8.2)
RBC # BLD AUTO: 4.2 X10*6/UL (ref 4.5–5.9)
SODIUM SERPL-SCNC: 142 MMOL/L (ref 136–145)
WBC # BLD AUTO: 8.4 X10*3/UL (ref 4.4–11.3)

## 2024-06-03 PROCEDURE — 83735 ASSAY OF MAGNESIUM: CPT | Performed by: NURSE PRACTITIONER

## 2024-06-03 PROCEDURE — 36415 COLL VENOUS BLD VENIPUNCTURE: CPT | Performed by: NURSE PRACTITIONER

## 2024-06-03 PROCEDURE — 93005 ELECTROCARDIOGRAM TRACING: CPT

## 2024-06-03 PROCEDURE — 84484 ASSAY OF TROPONIN QUANT: CPT | Performed by: NURSE PRACTITIONER

## 2024-06-03 PROCEDURE — 99285 EMERGENCY DEPT VISIT HI MDM: CPT | Mod: 25

## 2024-06-03 PROCEDURE — 96375 TX/PRO/DX INJ NEW DRUG ADDON: CPT

## 2024-06-03 PROCEDURE — 2500000004 HC RX 250 GENERAL PHARMACY W/ HCPCS (ALT 636 FOR OP/ED): Performed by: NURSE PRACTITIONER

## 2024-06-03 PROCEDURE — 2500000002 HC RX 250 W HCPCS SELF ADMINISTERED DRUGS (ALT 637 FOR MEDICARE OP, ALT 636 FOR OP/ED): Performed by: NURSE PRACTITIONER

## 2024-06-03 PROCEDURE — 71045 X-RAY EXAM CHEST 1 VIEW: CPT | Performed by: RADIOLOGY

## 2024-06-03 PROCEDURE — 96365 THER/PROPH/DIAG IV INF INIT: CPT

## 2024-06-03 PROCEDURE — 71045 X-RAY EXAM CHEST 1 VIEW: CPT

## 2024-06-03 PROCEDURE — 85025 COMPLETE CBC W/AUTO DIFF WBC: CPT | Performed by: NURSE PRACTITIONER

## 2024-06-03 PROCEDURE — 70450 CT HEAD/BRAIN W/O DYE: CPT

## 2024-06-03 PROCEDURE — 70450 CT HEAD/BRAIN W/O DYE: CPT | Performed by: RADIOLOGY

## 2024-06-03 PROCEDURE — 96366 THER/PROPH/DIAG IV INF ADDON: CPT

## 2024-06-03 PROCEDURE — 80053 COMPREHEN METABOLIC PANEL: CPT | Performed by: NURSE PRACTITIONER

## 2024-06-03 RX ORDER — POTASSIUM CHLORIDE 14.9 MG/ML
20 INJECTION INTRAVENOUS ONCE
Status: COMPLETED | OUTPATIENT
Start: 2024-06-03 | End: 2024-06-03

## 2024-06-03 RX ORDER — POTASSIUM CHLORIDE 20 MEQ/1
40 TABLET, EXTENDED RELEASE ORAL ONCE
Status: COMPLETED | OUTPATIENT
Start: 2024-06-03 | End: 2024-06-03

## 2024-06-03 RX ORDER — KETOROLAC TROMETHAMINE 30 MG/ML
15 INJECTION, SOLUTION INTRAMUSCULAR; INTRAVENOUS ONCE
Status: COMPLETED | OUTPATIENT
Start: 2024-06-03 | End: 2024-06-03

## 2024-06-03 RX ADMIN — POTASSIUM CHLORIDE 40 MEQ: 1500 TABLET, EXTENDED RELEASE ORAL at 08:29

## 2024-06-03 RX ADMIN — POTASSIUM CHLORIDE 20 MEQ: 14.9 INJECTION, SOLUTION INTRAVENOUS at 08:29

## 2024-06-03 RX ADMIN — KETOROLAC TROMETHAMINE 15 MG: 30 INJECTION, SOLUTION INTRAMUSCULAR at 08:29

## 2024-06-03 ASSESSMENT — LIFESTYLE VARIABLES
TOTAL SCORE: 0
EVER HAD A DRINK FIRST THING IN THE MORNING TO STEADY YOUR NERVES TO GET RID OF A HANGOVER: NO
EVER FELT BAD OR GUILTY ABOUT YOUR DRINKING: NO
HAVE YOU EVER FELT YOU SHOULD CUT DOWN ON YOUR DRINKING: NO
HAVE PEOPLE ANNOYED YOU BY CRITICIZING YOUR DRINKING: NO

## 2024-06-03 ASSESSMENT — PAIN DESCRIPTION - LOCATION: LOCATION: HEAD

## 2024-06-03 ASSESSMENT — PAIN - FUNCTIONAL ASSESSMENT
PAIN_FUNCTIONAL_ASSESSMENT: 0-10

## 2024-06-03 ASSESSMENT — COLUMBIA-SUICIDE SEVERITY RATING SCALE - C-SSRS
6. HAVE YOU EVER DONE ANYTHING, STARTED TO DO ANYTHING, OR PREPARED TO DO ANYTHING TO END YOUR LIFE?: NO
1. IN THE PAST MONTH, HAVE YOU WISHED YOU WERE DEAD OR WISHED YOU COULD GO TO SLEEP AND NOT WAKE UP?: NO
2. HAVE YOU ACTUALLY HAD ANY THOUGHTS OF KILLING YOURSELF?: NO

## 2024-06-03 ASSESSMENT — PAIN SCALES - GENERAL
PAINLEVEL_OUTOF10: 0 - NO PAIN
PAINLEVEL_OUTOF10: 8
PAINLEVEL_OUTOF10: 3
PAINLEVEL_OUTOF10: 0 - NO PAIN

## 2024-06-03 NOTE — ED PROVIDER NOTES
HPI   Chief Complaint   Patient presents with    Hypertension     Last Energy Drink and smoke 1hr prior       53-year-old male presents emergency department, states while working this evening started feeling unwell about 30 to 40 minutes ago, describes headache.  Checked his blood pressure and found it to be significantly elevated.  Went and laid down for about 15 to 20 minutes and then rechecked blood pressure and it only was higher than the previous reading.  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    On amlodipine and metoprolol, due for medications this morning but has not missed any doses recently.    Discussed chest pain or shortness of breath, may be some very mild chest discomfort but no shortness of breath associated with the elevated blood pressure.      History provided by:  Patient   used: No                        Hieu Coma Scale Score: 15                     Patient History   Past Medical History:   Diagnosis Date    Personal history of other diseases of the circulatory system 01/29/2021    History of hypertension    Personal history of other diseases of the digestive system 01/29/2021    History of Acevedo's esophagus    Personal history of other specified conditions 01/29/2021    History of seizure     Past Surgical History:   Procedure Laterality Date    OTHER SURGICAL HISTORY  01/29/2021    [place] tooth extraction    OTHER SURGICAL HISTORY  01/29/2021    Shoulder surgery    OTHER SURGICAL HISTORY  01/29/2021    Facial surgery     No family history on file.  Social History     Tobacco Use    Smoking status: Every Day     Types: Cigarettes    Smokeless tobacco: Not on file   Substance Use Topics    Alcohol use: Defer    Drug use: Defer       Physical Exam   ED Triage Vitals [06/03/24 0623]   Temperature Heart Rate Respirations BP   36.7 °C (98.1 °F) 62 16 (!) 218/102      Pulse Ox Temp Source Heart Rate Source Patient  Position   95 % Tympanic -- Sitting      BP Location FiO2 (%)     Right arm --       Physical Exam  Physical Exam:  Constitutional: Vitals noted, no distress. Afebrile.   Cardiovascular: Regular, rate, rhythm, no murmur.   Pulmonary: Lungs clear bilaterally with good aeration. No adventitious breath sounds.   Gastrointestinal: Soft, nonsurgical. Nontender. No peritoneal signs. Normoactive bowel sounds.   Musculoskeletal: No peripheral edema. Negative Homans bilaterally, no cords.   Skin: No rash.   Neuro: No focal neurologic deficits, NIH score of 0.    ED Course & MDM   Diagnoses as of 06/03/24 0925   Hypertensive urgency   Acute nonintractable headache, unspecified headache type   Hypokalemia     Labs Reviewed   CBC WITH AUTO DIFFERENTIAL - Abnormal       Result Value    WBC 8.4      nRBC 0.0      RBC 4.20 (*)     Hemoglobin 13.6      Hematocrit 36.9 (*)     MCV 88      MCH 32.4      MCHC 36.9 (*)     RDW 12.1      Platelets 235      Neutrophils % 53.2      Immature Granulocytes %, Automated 0.5      Lymphocytes % 35.8      Monocytes % 7.3      Eosinophils % 2.6      Basophils % 0.6      Neutrophils Absolute 4.49      Immature Granulocytes Absolute, Automated 0.04      Lymphocytes Absolute 3.02      Monocytes Absolute 0.62      Eosinophils Absolute 0.22      Basophils Absolute 0.05     COMPREHENSIVE METABOLIC PANEL - Abnormal    Glucose 103 (*)     Sodium 142      Potassium 2.8 (*)     Chloride 103      Bicarbonate 31      Anion Gap 11      Urea Nitrogen 10      Creatinine 0.97      eGFR >90      Calcium 9.3      Albumin 4.1      Alkaline Phosphatase 74      Total Protein 6.2 (*)     AST 24      Bilirubin, Total 0.7      ALT 33     MAGNESIUM - Normal    Magnesium 2.02     SERIAL TROPONIN-INITIAL - Normal    Troponin I, High Sensitivity 13      Narrative:     Less than 99th percentile of normal range cutoff-  Female and children under 18 years old <14 ng/L; Male <21 ng/L: Negative  Repeat testing should be  performed if clinically indicated.     Female and children under 18 years old 14-50 ng/L; Male 21-50 ng/L:  Consistent with possible cardiac damage and possible increased clinical   risk. Serial measurements may help to assess extent of myocardial damage.     >50 ng/L: Consistent with cardiac damage, increased clinical risk and  myocardial infarction. Serial measurements may help assess extent of   myocardial damage.      NOTE: Children less than 1 year old may have higher baseline troponin   levels and results should be interpreted in conjunction with the overall   clinical context.     NOTE: Troponin I testing is performed using a different   testing methodology at [facility] than at other   [facility]. Direct result comparisons should only   be made within the same method.   SERIAL TROPONIN, 1 HOUR - Normal    Troponin I, High Sensitivity 12      Narrative:     Less than 99th percentile of normal range cutoff-  Female and children under 18 years old <14 ng/L; Male <21 ng/L: Negative  Repeat testing should be performed if clinically indicated.     Female and children under 18 years old 14-50 ng/L; Male 21-50 ng/L:  Consistent with possible cardiac damage and possible increased clinical   risk. Serial measurements may help to assess extent of myocardial damage.     >50 ng/L: Consistent with cardiac damage, increased clinical risk and  myocardial infarction. Serial measurements may help assess extent of   myocardial damage.      NOTE: Children less than 1 year old may have higher baseline troponin   levels and results should be interpreted in conjunction with the overall   clinical context.     NOTE: Troponin I testing is performed using a different   testing methodology at [facility] than at other   [facility]. Direct result comparisons should only   be made within the same method.   TROPONIN SERIES- (INITIAL, 1 HR)    Narrative:     The following orders were created for  panel order Troponin I Series, High Sensitivity (0, 1 HR).  Procedure                               Abnormality         Status                     ---------                               -----------         ------                     Troponin I, High Sensiti...[[id number]]  Normal              Final result               Troponin, High Sensitivi...[[id number]]  Normal              Final result                 Please view results for these tests on the individual orders.        XR chest 1 view   Final Result   No acute cardiopulmonary disease.        The superior mediastinum appears somewhat enlarged. This may be in   part due to rotation. This may be due to mediastinal fat.        MACRO:   none        Signed by: Mady Kulkarni 6/3/2024 7:55 AM   Dictation workstation:   [employer]      CT head wo IV contrast   Final Result   No acute intracranial abnormality.             MACRO:   none        Signed by: Mady Kulkarni 6/3/2024 7:53 AM   Dictation workstation:   [employer]            Medical Decision Making  EKG performed at 6:30 AM with ventricular of 60, as read by me, shows normal sinus rhythm with sinus arrhythmia, normal axis, normal interval, unremarkable ST and T wave patterns, no evidence of acute ischemia or other acute findings.    Initial blood pressure elevated, 218/102, although repeat did downtrend slowly, antihypertensives were held.    CBC unremarkable, metabolic panel did show hypokalemia at 2.8, otherwise unremarkable.  Initial troponin 13, repeat 12.    Given the patient's elevated blood pressure and headache CT imaging of the head was ordered to rule out head bleed, CT unremarkable.  Chest x-ray unremarkable.    Repeat EKG performed at 801 with ventricular rate of 53, as interpreted by me, shows sinus bradycardia with sinus arrhythmia, normal axis normal intervals, unremarkable ST and T wave patterns, no evidence of acute ischemia or other acute findings.    Patient was given 40 mill equivalents of  potassium orally and 20 mill equivalents IV.  15 mg of IV Toradol administered for the headache as well.  On reevaluation he states feeling much improved.    Discussed taking his blood pressure medications when he arrives home, recommended follow-up regarding his blood pressure and his potassium levels with primary care this week.  Will closely monitor his symptoms, return with any worsening symptoms or any additional concerns.    Shared CORNEL Attestation:    This patient was seen by the advanced practice provider.  I personally saw the patient and made/approved the management plan and take responsibility for the patient management.    History: 53-year-old male presents with headache.    Exam: Regular rate and rhythm cardiac exam with clear breath sounds bilaterally.  Abdomen is soft and nontender.  Neurological exam is grossly intact.  NIH stroke scale is 0.  Negative Homans' sign bilaterally.    MDM: Stroke, intracranial bleed, hypertensive urgency, tox    I have seen and examined the patient, agree with the workup, evaluation, medical decision making, management and diagnosis.  The care plan has been discussed.    Lefty Escalona MD      Procedure  Procedures     Anette Menchaca, SILVIA-CNP  06/03/24 0927

## 2024-06-09 LAB
ATRIAL RATE: 53 BPM
ATRIAL RATE: 60 BPM
P AXIS: 40 DEGREES
P AXIS: 41 DEGREES
P OFFSET: 189 MS
P OFFSET: 193 MS
P ONSET: 129 MS
P ONSET: 129 MS
PR INTERVAL: 168 MS
PR INTERVAL: 168 MS
Q ONSET: 213 MS
Q ONSET: 213 MS
QRS COUNT: 10 BEATS
QRS COUNT: 9 BEATS
QRS DURATION: 100 MS
QRS DURATION: 102 MS
QT INTERVAL: 406 MS
QT INTERVAL: 442 MS
QTC CALCULATION(BAZETT): 406 MS
QTC CALCULATION(BAZETT): 414 MS
QTC FREDERICIA: 406 MS
QTC FREDERICIA: 424 MS
R AXIS: 26 DEGREES
R AXIS: 42 DEGREES
T AXIS: 46 DEGREES
T AXIS: 52 DEGREES
T OFFSET: 416 MS
T OFFSET: 434 MS
VENTRICULAR RATE: 53 BPM
VENTRICULAR RATE: 60 BPM

## 2024-07-28 ENCOUNTER — HOSPITAL ENCOUNTER (EMERGENCY)
Facility: HOSPITAL | Age: 54
Discharge: HOME | End: 2024-07-28
Payer: COMMERCIAL

## 2024-07-28 ENCOUNTER — APPOINTMENT (OUTPATIENT)
Dept: RADIOLOGY | Facility: HOSPITAL | Age: 54
End: 2024-07-28
Payer: COMMERCIAL

## 2024-07-28 VITALS
OXYGEN SATURATION: 95 % | HEIGHT: 70 IN | WEIGHT: 217 LBS | HEART RATE: 77 BPM | BODY MASS INDEX: 31.07 KG/M2 | SYSTOLIC BLOOD PRESSURE: 161 MMHG | TEMPERATURE: 98.4 F | DIASTOLIC BLOOD PRESSURE: 93 MMHG | RESPIRATION RATE: 18 BRPM

## 2024-07-28 DIAGNOSIS — S70.01XA CONTUSION OF RIGHT HIP, INITIAL ENCOUNTER: Primary | ICD-10-CM

## 2024-07-28 DIAGNOSIS — S93.401A SPRAIN OF RIGHT ANKLE, UNSPECIFIED LIGAMENT, INITIAL ENCOUNTER: ICD-10-CM

## 2024-07-28 PROCEDURE — 96372 THER/PROPH/DIAG INJ SC/IM: CPT

## 2024-07-28 PROCEDURE — 73502 X-RAY EXAM HIP UNI 2-3 VIEWS: CPT | Mod: RT

## 2024-07-28 PROCEDURE — 73502 X-RAY EXAM HIP UNI 2-3 VIEWS: CPT | Mod: RIGHT SIDE | Performed by: RADIOLOGY

## 2024-07-28 PROCEDURE — 2500000004 HC RX 250 GENERAL PHARMACY W/ HCPCS (ALT 636 FOR OP/ED)

## 2024-07-28 PROCEDURE — 99284 EMERGENCY DEPT VISIT MOD MDM: CPT

## 2024-07-28 PROCEDURE — 73610 X-RAY EXAM OF ANKLE: CPT | Mod: RT

## 2024-07-28 PROCEDURE — 73610 X-RAY EXAM OF ANKLE: CPT | Mod: RIGHT SIDE | Performed by: RADIOLOGY

## 2024-07-28 RX ORDER — KETOROLAC TROMETHAMINE 30 MG/ML
30 INJECTION, SOLUTION INTRAMUSCULAR; INTRAVENOUS ONCE
Status: COMPLETED | OUTPATIENT
Start: 2024-07-28 | End: 2024-07-28

## 2024-07-28 RX ORDER — NAPROXEN SODIUM 550 MG/1
550 TABLET ORAL
Qty: 14 TABLET | Refills: 0 | Status: SHIPPED | OUTPATIENT
Start: 2024-07-28 | End: 2024-08-04

## 2024-07-28 ASSESSMENT — PAIN SCALES - GENERAL
PAINLEVEL_OUTOF10: 1
PAINLEVEL_OUTOF10: 4

## 2024-07-28 ASSESSMENT — PAIN DESCRIPTION - DESCRIPTORS: DESCRIPTORS: ACHING

## 2024-07-28 ASSESSMENT — PAIN DESCRIPTION - ORIENTATION: ORIENTATION: RIGHT

## 2024-07-28 ASSESSMENT — PAIN DESCRIPTION - LOCATION: LOCATION: HIP

## 2024-07-28 ASSESSMENT — LIFESTYLE VARIABLES
HAVE PEOPLE ANNOYED YOU BY CRITICIZING YOUR DRINKING: NO
EVER HAD A DRINK FIRST THING IN THE MORNING TO STEADY YOUR NERVES TO GET RID OF A HANGOVER: NO
EVER FELT BAD OR GUILTY ABOUT YOUR DRINKING: NO
HAVE YOU EVER FELT YOU SHOULD CUT DOWN ON YOUR DRINKING: NO
TOTAL SCORE: 0

## 2024-07-28 ASSESSMENT — PAIN - FUNCTIONAL ASSESSMENT: PAIN_FUNCTIONAL_ASSESSMENT: 0-10
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
## 2024-08-02 ENCOUNTER — APPOINTMENT (OUTPATIENT)
Dept: PRIMARY CARE | Facility: CLINIC | Age: 54
End: 2024-08-02
Payer: COMMERCIAL

## 2024-08-05 ENCOUNTER — APPOINTMENT (OUTPATIENT)
Dept: ORTHOPEDIC SURGERY | Facility: CLINIC | Age: 54
End: 2024-08-05
Payer: COMMERCIAL

## 2024-08-05 DIAGNOSIS — S70.00XA CONTUSION OF HIP, INITIAL ENCOUNTER: ICD-10-CM

## 2024-08-05 DIAGNOSIS — S93.491A HIGH ANKLE SPRAIN, RIGHT, INITIAL ENCOUNTER: ICD-10-CM

## 2024-08-05 PROCEDURE — 99203 OFFICE O/P NEW LOW 30 MIN: CPT | Performed by: ORTHOPAEDIC SURGERY

## 2024-08-05 PROCEDURE — L4361 PNEUMA/VAC WALK BOOT PRE OTS: HCPCS | Performed by: ORTHOPAEDIC SURGERY

## 2024-08-05 RX ORDER — PREDNISONE 10 MG/1
TABLET ORAL
Qty: 30 TABLET | Refills: 0 | Status: SHIPPED | OUTPATIENT
Start: 2024-08-05

## 2024-08-05 NOTE — LETTER
August 5, 2024     Patient: Nick Blakely   YOB: 1970   Date of Visit: 8/5/2024       To Whom It May Concern:    It is my medical opinion that Nick Blakely may return to light duty immediately with the following restrictions: must use crutches,  100% sit down work ONLY  X 8 weeks.    If you have any questions or concerns, please don't hesitate to call.         Sincerely,        Prince Gaytan MD    CC: No Recipients
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
## 2024-08-14 ENCOUNTER — TELEPHONE (OUTPATIENT)
Dept: PHYSICAL THERAPY | Facility: HOSPITAL | Age: 54
End: 2024-08-14
Payer: OTHER GOVERNMENT

## 2024-08-14 NOTE — TELEPHONE ENCOUNTER
[facility] FAXED C9 APPROVAL FOR PT & DENIAL FOR CORTISONE INJ RQST FOR ORTHO FROM [facility] RE: NEW PATIENT REFERRAL. CHECKED AND PT WAS NOT CALLED TO SCHED APPROVED VISITS. PC TO PT TO RTN CALL TO SCHED APPTS

## 2024-08-19 ENCOUNTER — APPOINTMENT (OUTPATIENT)
Dept: ORTHOPEDIC SURGERY | Facility: CLINIC | Age: 54
End: 2024-08-19
Payer: COMMERCIAL

## 2024-08-20 ENCOUNTER — TELEPHONE (OUTPATIENT)
Dept: PHYSICAL THERAPY | Facility: HOSPITAL | Age: 54
End: 2024-08-20
Payer: OTHER GOVERNMENT

## 2024-08-20 NOTE — TELEPHONE ENCOUNTER
2ND ATTEMPT TO REACH PT TO SCHED FOR APPROVED [facility] VISITS; SPOKE TO PT DIRECT AND HE IS ON THE ROAD DRIVING. STATES WILL CALL BACK TO SCHED APPROVED VISITS.       08/14/24   [facility] APPROVED PT 12V                08/14/24 THRU 09/27/24                CLAIM# [id number] SI                DOI: 07/28/24 RT ANKLE/HIP-

## 2024-08-26 ENCOUNTER — APPOINTMENT (OUTPATIENT)
Dept: ORTHOPEDIC SURGERY | Facility: CLINIC | Age: 54
End: 2024-08-26
Payer: COMMERCIAL

## 2024-08-26 ENCOUNTER — HOSPITAL ENCOUNTER (OUTPATIENT)
Dept: RADIOLOGY | Facility: HOSPITAL | Age: 54
Discharge: HOME | End: 2024-08-26
Payer: COMMERCIAL

## 2024-08-26 DIAGNOSIS — S70.00XA CONTUSION OF HIP, INITIAL ENCOUNTER: ICD-10-CM

## 2024-08-26 DIAGNOSIS — S93.491A HIGH ANKLE SPRAIN, RIGHT, INITIAL ENCOUNTER: ICD-10-CM

## 2024-08-26 PROCEDURE — 99213 OFFICE O/P EST LOW 20 MIN: CPT | Performed by: ORTHOPAEDIC SURGERY

## 2024-08-26 PROCEDURE — 73610 X-RAY EXAM OF ANKLE: CPT | Mod: RT

## 2024-08-26 PROCEDURE — L1902 AFO ANKLE GAUNTLET PRE OTS: HCPCS | Performed by: ORTHOPAEDIC SURGERY

## 2024-08-26 NOTE — LETTER
August 26, 2024     Patient: Nick Blakely   YOB: 1970   Date of Visit: 8/26/2024       To Whom It May Concern:    It is my medical opinion that Nick Blakely may return to work on 9/22/24 with no restrictions .    If you have any questions or concerns, please don't hesitate to call.         Sincerely,        Prince Gaytan MD    CC: No Recipients

## 2024-08-26 NOTE — PROGRESS NOTES
History of present illness: Patient here with fall at work hip contusion essentially resolved ankle sprain significantly improving swelling ecchymosis bruising edema is way down less tender he is ready to wean out of the boot he is got therapy start    Physical exam:    General: No acute distress or breathing difficulty or discomfort, pleasant and cooperative with the examination.    Extremities: Right ankle shows good flexion inversion eversion tender over the lateral fibular tip    No pain with weightbearing ecchymosis bruising swelling edema is gone good cap refills and pulses neurovascular intact    Intact Achilles posterior tib anterior tendon just some mild soreness and swelling    Right hip contusion essentially resolved ecchymosis bruising and swelling resolved    Diagnostic studies: X-rays 3 views right ankle    Impression: Ankle sprain strain now resolving    Right hip contusion resolved    Plan: Ankles training just rehab conditioning strengthening    Sparkle-NEVIN support brace for comfort    He can return to work without restrictions after 9/21/2024 I will see him back in the future as needed

## 2024-09-09 ENCOUNTER — APPOINTMENT (OUTPATIENT)
Dept: PHYSICAL THERAPY | Facility: CLINIC | Age: 54
End: 2024-09-09
Payer: OTHER GOVERNMENT

## 2024-09-16 ENCOUNTER — EVALUATION (OUTPATIENT)
Dept: PHYSICAL THERAPY | Facility: HOSPITAL | Age: 54
End: 2024-09-16
Payer: COMMERCIAL

## 2024-09-16 DIAGNOSIS — S70.00XA CONTUSION OF HIP, INITIAL ENCOUNTER: ICD-10-CM

## 2024-09-16 DIAGNOSIS — S93.491A HIGH ANKLE SPRAIN, RIGHT, INITIAL ENCOUNTER: ICD-10-CM

## 2024-09-16 PROCEDURE — 97110 THERAPEUTIC EXERCISES: CPT | Mod: GP

## 2024-09-16 PROCEDURE — 97161 PT EVAL LOW COMPLEX 20 MIN: CPT | Mod: GP

## 2024-09-16 ASSESSMENT — PATIENT HEALTH QUESTIONNAIRE - PHQ9
SUM OF ALL RESPONSES TO PHQ9 QUESTIONS 1 AND 2: 0
1. LITTLE INTEREST OR PLEASURE IN DOING THINGS: NOT AT ALL
2. FEELING DOWN, DEPRESSED OR HOPELESS: NOT AT ALL

## 2024-09-16 ASSESSMENT — PAIN SCALES - GENERAL: PAINLEVEL_OUTOF10: 1

## 2024-09-16 ASSESSMENT — PAIN - FUNCTIONAL ASSESSMENT: PAIN_FUNCTIONAL_ASSESSMENT: 0-10

## 2024-09-16 NOTE — LETTER
September 16, 2024    Letty Helms DPT  [address] [place], [address] [place] [address]    Patient: Nick Blakely   YOB: 1970   Date of Visit: 9/16/2024       Dear Prince Gaytan MD  [address]   [facility], [address] [place] [address],  [place] [address]    The attached plan of care is being sent to you because your patient’s medical reimbursement requires that you certify the plan of care. Your signature is required to allow uninterrupted insurance coverage.      You may indicate your approval by signing below and faxing this form back to us at Dept Fax: [phone].    Please call Dept: [phone] with any questions or concerns.    Thank you for this referral,        Letty Helms DPT   [facility] MEDICAL OFFICE BUILDING  [address] [facility] [id number]    Payer: Payor: INDUSTRIAL EMPLOYEE / Plan: INDUSTRIAL EMPLOYEE / Product Type: *No Product type* /                                                                         Date:     Dear Letty Helms DPT,     Re: Mr. Nick Blakely, MRN:[id number]    I certify that I have reviewed the attached plan of care and it is medically necessary for Mr. Nick Blakely (1970) who is under my care.          ______________________________________                    _________________  Provider name and credentials                                           Date and time                                                                                           Plan of Care 9/16/24   Effective from: 9/16/2024  Effective to: 12/15/2024    Plan ID: [id number]            Participants as of Finalize on 9/16/2024    Name Type Comments Contact Info    Prince Gaytan MD Referring Provider  [phone]    Letty Helms DPT Physical Therapist  [phone]       Last Plan Note     Author: Letty Helms DPT Status: Incomplete Last edited: 9/16/2024  7:00 AM       Physical  Therapy    Physical Therapy Evaluation and Treatment      Patient Name: Nick Blakely  MRN: [id number]  Today's Date: 9/16/2024    Time Entry:   Time Calculation  Start Time: 0721  Stop Time: 0759  Time Calculation (min): 38 min  PT Evaluation Time Entry  PT Evaluation (Low) Time Entry: 20  PT Therapeutic Procedures Time Entry  Therapeutic Exercise Time Entry: 18                   Assessment:  PT Assessment  PT Assessment Results: Decreased strength, Decreased range of motion, Decreased endurance, Impaired balance, Decreased mobility, Orthopedic restrictions, Pain  Rehab Prognosis: Good  Evaluation/Treatment Tolerance: Patient tolerated treatment well  Strengths: Ability to acquire knowledge, Attitude of self   **Patient arrived 20 minutes late to scheduled evaluation    Patient is a  53 y/o male presents with c/o R hip and ankle pain s/p work related injury. Notes overall improvement in R hip and believes symptoms are related to bone spur. Would like to focus on ankle. Upon examination patient demonstrates decreased R ankle ROM, R ankle strength, impaired flexibility, impaired balance  limiting overall functional mobility including long distance ambulation and ability to complete all work related needs. Pt to benefit from outpatient PT to address deficits, maximize functional mobility and improve QOL.  The clinical presentation of this patient is stable and their history and examination findings are consistent with a low complexity evaluation with good rehab potential.      Plan:  OP PT Plan  Treatment/Interventions: Cryotherapy, Dry needling, Education/ Instruction, Manual therapy, Gait training, Neuromuscular re-education, Self care/ home management, Taping techniques, Therapeutic activities, Therapeutic exercises  PT Plan: Skilled PT  PT Frequency: 2 times per week  Duration: 12 visits  Onset Date: 07/28/24  Certification Period Start Date: 08/14/24  Certification Period End Date: 10/31/24  Number of Treatments  Authorized: 12  Rehab Potential: Good  Plan of Care Agreement: Patient    Current Problem:   1. Contusion of hip, initial encounter  Referral to Physical Therapy      2. High ankle sprain, right, initial encounter  Referral to Physical Therapy          Subjective   General:  General  Reason for Referral: R hip/R ankle  Referred By: Dr. Remigio Gaytan  Past Medical History Relevant to Rehab: seizures, migraines  Patient was injured on 7/28/24. 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 He notes he was at the bottom of the pile. About 15-20 minutes after this occurred is when pain began. He injured R ankle and R ankle. He notes that he was diagnosed with a hip pointer and bone spur. Notes that his hip is improvement.         Notes pain to R ankle with prolonged walking. Inversion and eversion. Ambulates with SPC PRN.   Pain is medial and lateral R ankle, and talus. Had some muscular strain along tibialis anterior that has improved.     Icing when swelling occurs. Using SPC at work.     Precautions:  Precautions  Precautions Comment: light duty for work, seizures     Pain:  Pain Assessment  Pain Assessment: 0-10  0-10 (Numeric) Pain Score: 1  Pain Type: Acute pain  Pain Location: Ankle  Pain Orientation: Right     Prior Level of Function: Has RTW doing limited duty. 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       Objective          PALPATION: distal to B malleolis            BALANCE: NT            GAIT: antalgic, amb with SPC, uneven step length, decreased juliana            AROM  Right DF: -10      Left DF: WFL    Right PF:  40    Left PF: WFL    Right inversion: 35      Left inversion: WFL    Right eversion:  18    Left eversion: WFL    Right hip flexion: WFL      Left hip flexion: WFL    Right hip ER seated: WFL     Left hip ER seated: WFL    Right hip IR seated: 20     Left hip IR seated: 20                MMT  Right tibialis anterior:  3+/5    Left tibialis anterior: 5/5    Right EHL/ED: 4-/5     Left EHL/ED: 5/5    Right tibialis posterior: 3+/5      Left tibialis posterior: 5/5    Right peroneals: 3+/5      Left peroneals: 5/5    Right gastrocnemius: 3/5     Left gastrocnemius: 4/5    Right hip ER: 4-/5      Left hip ER: 4+/5    Right hip IR:  4-/5    Left hip IR: 4+/5    Right quadriceps: 4+/5      Left quadriceps: 4-/5    Right iliopsoas: 4-/5      Left iliopsoas: 4+/5    Right gluteus medius: 4-/5      Left gluteus medius: 4-/5    Right gluteus kaley: 4+/5      Left gluteus kaley: 4+/5    Right hamstrin+/5     Left hamstrin-/5              Flexibility  Right gastrocnemius: poor     Left gastrocnemius: good    Right soleus: poor     Left soleus: good          Outcome Measures:  Other Measures  Lower Extremity Funtional Score (LEFS): 46     Treatments:  Ankle circles: 20x each  Ankle ABCs: 1x  Gastroc stretch/soleus stretch: 3x30 seconds each      EDUCATION:  Outpatient Education  Individual(s) Educated: Patient  Education Provided: Anatomy, Home Exercise Program, POC  Risk and Benefits Discussed with Patient/Caregiver/Other: yes  Patient/Caregiver Demonstrated Understanding: yes  Plan of Care Discussed and Agreed Upon: yes  Patient Response to Education: Patient/Caregiver Verbalized Understanding of Information    Goals:  1. Patient will demonstrate independence with HEP  2. Patient will demonstrate R ankle DF >/=5* to improve gait mechanics  3. Patient will demonstrate R ankle strength >/=4/5 to improve functional mobility   4. Patient will demonstrate >/=20 point improvement in LEFS demonstrating improved functional mobility   5. Patient will be able to ambulate on various surfaces without gait deviations and no c/o R ankle pain                 Current Participants as of 2024    Name Type Comments Contact Info    Prince Gaytan MD Referring Provider  [phone]    Signature  pending    Letty Helms, DPT Physical Therapist  [phone]    Signature pending

## 2024-09-16 NOTE — PROGRESS NOTES
Physical Therapy    Physical Therapy Evaluation and Treatment      Patient Name: Nick Blakely  MRN: [id number]  Today's Date: 9/16/2024    Time Entry:   Time Calculation  Start Time: 0721  Stop Time: 0759  Time Calculation (min): 38 min  PT Evaluation Time Entry  PT Evaluation (Low) Time Entry: 20  PT Therapeutic Procedures Time Entry  Therapeutic Exercise Time Entry: 18                   Assessment:  PT Assessment  PT Assessment Results: Decreased strength, Decreased range of motion, Decreased endurance, Impaired balance, Decreased mobility, Orthopedic restrictions, Pain  Rehab Prognosis: Good  Evaluation/Treatment Tolerance: Patient tolerated treatment well  Strengths: Ability to acquire knowledge, Attitude of self   **Patient arrived 20 minutes late to scheduled evaluation    Patient is a  55 y/o male presents with c/o R hip and ankle pain s/p work related injury. Notes overall improvement in R hip and believes symptoms are related to bone spur. Would like to focus on ankle. Upon examination patient demonstrates decreased R ankle ROM, R ankle strength, impaired flexibility, impaired balance  limiting overall functional mobility including long distance ambulation and ability to complete all work related needs. Pt to benefit from outpatient PT to address deficits, maximize functional mobility and improve QOL.  The clinical presentation of this patient is stable and their history and examination findings are consistent with a low complexity evaluation with good rehab potential.      Plan:  OP PT Plan  Treatment/Interventions: Cryotherapy, Dry needling, Education/ Instruction, Manual therapy, Gait training, Neuromuscular re-education, Self care/ home management, Taping techniques, Therapeutic activities, Therapeutic exercises  PT Plan: Skilled PT  PT Frequency: 2 times per week  Duration: 12 visits  Onset Date: 07/28/24  Certification Period Start Date: 08/14/24  Certification Period End Date: 10/31/24  Number of  Treatments Authorized: 12  Rehab Potential: Good  Plan of Care Agreement: Patient    Current Problem:   1. Contusion of hip, initial encounter  Referral to Physical Therapy      2. High ankle sprain, right, initial encounter  Referral to Physical Therapy          Subjective    General:  General  Reason for Referral: R hip/R ankle  Referred By: Dr. Remigio Gaytan  Past Medical History Relevant to Rehab: seizures, migraines  Patient was injured on 7/28/24. 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 He notes he was at the bottom of the pile. About 15-20 minutes after this occurred is when pain began. He injured R ankle and R ankle. He notes that he was diagnosed with a hip pointer and bone spur. Notes that his hip is improvement.         Notes pain to R ankle with prolonged walking. Inversion and eversion. Ambulates with SPC PRN.   Pain is medial and lateral R ankle, and talus. Had some muscular strain along tibialis anterior that has improved.     Icing when swelling occurs. Using SPC at work.     Precautions:  Precautions  Precautions Comment: light duty for work, seizures     Pain:  Pain Assessment  Pain Assessment: 0-10  0-10 (Numeric) Pain Score: 1  Pain Type: Acute pain  Pain Location: Ankle  Pain Orientation: Right     Prior Level of Function: Has RTW doing limited duty. 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       Objective           PALPATION: distal to B malleolis            BALANCE: NT            GAIT: antalgic, amb with SPC, uneven step length, decreased juliana            AROM  Right DF: -10      Left DF: WFL    Right PF:  40    Left PF: WFL    Right inversion: 35      Left inversion: WFL    Right eversion:  18    Left eversion: WFL    Right hip flexion: WFL      Left hip flexion: WFL    Right hip ER seated: WFL     Left hip ER seated: WFL    Right hip IR seated: 20     Left hip IR seated: 20                MMT  Right tibialis anterior:  3+/5    Left tibialis anterior: 5/5    Right EHL/ED: 4-/5     Left EHL/ED: 5/5    Right tibialis posterior: 3+/5      Left tibialis posterior: 5/5    Right peroneals: 3+/5      Left peroneals: 5/5    Right gastrocnemius: 3/5     Left gastrocnemius: 4/5    Right hip ER: 4-/5      Left hip ER: 4+/5    Right hip IR:  4-/5    Left hip IR: 4+/5    Right quadriceps: 4+/5      Left quadriceps: 4-/5    Right iliopsoas: 4-/5      Left iliopsoas: 4+/5    Right gluteus medius: 4-/5      Left gluteus medius: 4-/5    Right gluteus kaley: 4+/5      Left gluteus kaley: 4+/5    Right hamstrin+/5     Left hamstrin-/5              Flexibility  Right gastrocnemius: poor     Left gastrocnemius: good    Right soleus: poor     Left soleus: good          Outcome Measures:  Other Measures  Lower Extremity Funtional Score (LEFS): 46     Treatments:  Ankle circles: 20x each  Ankle ABCs: 1x  Gastroc stretch/soleus stretch: 3x30 seconds each      EDUCATION:  Outpatient Education  Individual(s) Educated: Patient  Education Provided: Anatomy, Home Exercise Program, POC  Risk and Benefits Discussed with Patient/Caregiver/Other: yes  Patient/Caregiver Demonstrated Understanding: yes  Plan of Care Discussed and Agreed Upon: yes  Patient Response to Education: Patient/Caregiver Verbalized Understanding of Information    Goals:  1. Patient will demonstrate independence with HEP  2. Patient will demonstrate R ankle DF >/=5* to improve gait mechanics  3. Patient will demonstrate R ankle strength >/=4/5 to improve functional mobility   4. Patient will demonstrate >/=20 point improvement in LEFS demonstrating improved functional mobility   5. Patient will be able to ambulate on various surfaces without gait deviations and no c/o R ankle pain

## 2024-09-23 ENCOUNTER — TREATMENT (OUTPATIENT)
Dept: PHYSICAL THERAPY | Facility: HOSPITAL | Age: 54
End: 2024-09-23
Payer: COMMERCIAL

## 2024-09-23 DIAGNOSIS — S93.401A SPRAIN OF RIGHT ANKLE: Primary | ICD-10-CM

## 2024-09-23 DIAGNOSIS — S70.01XA HEMATOMA OF RIGHT HIP: ICD-10-CM

## 2024-09-23 PROCEDURE — 97110 THERAPEUTIC EXERCISES: CPT | Mod: GP

## 2024-09-23 ASSESSMENT — PAIN - FUNCTIONAL ASSESSMENT: PAIN_FUNCTIONAL_ASSESSMENT: 0-10

## 2024-09-23 ASSESSMENT — PAIN SCALES - GENERAL: PAINLEVEL_OUTOF10: 3

## 2024-09-23 NOTE — PROGRESS NOTES
Physical Therapy    Physical Therapy Treatment    Patient Name: Nick Blakely  MRN: [id number]  Today's Date: 9/23/2024    Time Entry:   Time Calculation  Start Time: 0710  Stop Time: 0744  Time Calculation (min): 34 min     PT Therapeutic Procedures Time Entry  Therapeutic Exercise Time Entry: 34                 Insurance:   [facility] APPROVED PT 12V                08/14/24 THRU 10/31/24                CLAIM# [id number]  Visit: 2/12    Assessment:   **Patient arrived late to scheduled appointment resulting in shortened treatment.   He continues to demonstrate some shaking with ankle ROM during circles. Introduced gentle strengthening this date. Most difficulty with plantarflexion and inversion d/t weakness with some c/o increased pain to 4/10. HEP updated  Plan:   Continue to progress strength and ROM or R ankle    Current Problem  1. Sprain of right ankle        2. Hematoma of right hip            General   Patient notes that he just got off a 12 hour shift and notes soreness and swelling after running around the ED.      Subjective    Precautions  Precautions  Precautions Comment: light duty for work, seizures     Pain  Pain Assessment  Pain Assessment: 0-10  0-10 (Numeric) Pain Score: 3  Pain Type: Acute pain  Pain Location: Ankle  Pain Orientation: Right    Objective     Treatments:  Ankle circles: 20x each  Ankle circles on rock: 20x each   Ankle ABCs: 1x  Gastroc stretch/soleus stretch: 3x30 seconds each  Plantar fascia stretch: 3x30 seconds  4 way ankle: RTB x20  Resisted toe flexion: GTB x20    Access Code: [email or web address]  URL: [email or web address]  Date: 09/23/2024  Prepared by: Letty Helms    Exercises  - Seated Plantar Fascia Stretch  - 1 x daily - 7 x weekly - 3 sets - 1 reps - 30 seconds hold  - Great Toe Flexion with Resistance  - 1 x daily - 7 x weekly - 3 sets - 10 reps  - Ankle and Toe Plantarflexion with Resistance  - 1 x daily - 7 x weekly - 3 sets - 10 reps  - Ankle Dorsiflexion  with Resistance  - 1 x daily - 7 x weekly - 3 sets - 10 reps  - Ankle Eversion with Resistance  - 1 x daily - 7 x weekly - 3 sets - 10 reps  - Ankle Inversion with Resistance  - 1 x daily - 7 x weekly - 3 sets - 10 reps

## 2024-09-30 ENCOUNTER — APPOINTMENT (OUTPATIENT)
Dept: PHYSICAL THERAPY | Facility: HOSPITAL | Age: 54
End: 2024-09-30
Payer: COMMERCIAL

## 2024-09-30 DIAGNOSIS — S93.401A SPRAIN OF RIGHT ANKLE: Primary | ICD-10-CM

## 2024-09-30 DIAGNOSIS — S70.01XD HEMATOMA OF RIGHT HIP, SUBSEQUENT ENCOUNTER: ICD-10-CM

## 2024-10-02 ENCOUNTER — TELEPHONE (OUTPATIENT)
Dept: PHYSICAL THERAPY | Facility: HOSPITAL | Age: 54
End: 2024-10-02
Payer: OTHER GOVERNMENT

## 2024-10-02 NOTE — TELEPHONE ENCOUNTER
PC TO PT ON WAIT LIST TO ADV OPEN SLOT AVAIL 10/03 @ 7:30AM AND ALOS TO REVIEW UPCOMING WEEK TUES/THURS AVAIL 7:45AM. LMOM IN REFERENCE

## 2024-10-09 ENCOUNTER — TELEPHONE (OUTPATIENT)
Dept: PHYSICAL THERAPY | Facility: HOSPITAL | Age: 54
End: 2024-10-09
Payer: OTHER GOVERNMENT

## 2024-10-09 NOTE — TELEPHONE ENCOUNTER
PC TO PT ON WAIT LIST RE: OPEN SLOT W/VIKAS AVAIL @7:45A ON 10/10. PT IS SCHED ALREADY FR 11/11 BUT POR IS 2XWEEK; LMOM IN REFERENCE

## 2024-10-14 ENCOUNTER — APPOINTMENT (OUTPATIENT)
Dept: PHYSICAL THERAPY | Facility: HOSPITAL | Age: 54
End: 2024-10-14
Payer: COMMERCIAL

## 2024-10-18 ENCOUNTER — APPOINTMENT (OUTPATIENT)
Dept: PHYSICAL THERAPY | Facility: HOSPITAL | Age: 54
End: 2024-10-18
Payer: COMMERCIAL

## 2024-10-21 ENCOUNTER — APPOINTMENT (OUTPATIENT)
Dept: PHYSICAL THERAPY | Facility: HOSPITAL | Age: 54
End: 2024-10-21
Payer: COMMERCIAL

## 2024-10-25 ENCOUNTER — APPOINTMENT (OUTPATIENT)
Dept: PHYSICAL THERAPY | Facility: HOSPITAL | Age: 54
End: 2024-10-25
Payer: COMMERCIAL

## 2024-10-28 ENCOUNTER — APPOINTMENT (OUTPATIENT)
Dept: PHYSICAL THERAPY | Facility: HOSPITAL | Age: 54
End: 2024-10-28
Payer: COMMERCIAL

## 2024-10-30 ENCOUNTER — APPOINTMENT (OUTPATIENT)
Dept: PHYSICAL THERAPY | Facility: HOSPITAL | Age: 54
End: 2024-10-30
Payer: COMMERCIAL

## 2024-11-04 ENCOUNTER — APPOINTMENT (OUTPATIENT)
Dept: PHYSICAL THERAPY | Facility: HOSPITAL | Age: 54
End: 2024-11-04
Payer: COMMERCIAL

## 2024-11-07 ENCOUNTER — APPOINTMENT (OUTPATIENT)
Dept: PHYSICAL THERAPY | Facility: HOSPITAL | Age: 54
End: 2024-11-07
Payer: COMMERCIAL

## 2025-01-13 ENCOUNTER — APPOINTMENT (OUTPATIENT)
Dept: RADIOLOGY | Facility: HOSPITAL | Age: 55
End: 2025-01-13
Payer: OTHER GOVERNMENT

## 2025-01-13 ENCOUNTER — HOSPITAL ENCOUNTER (EMERGENCY)
Facility: HOSPITAL | Age: 55
Discharge: HOME | End: 2025-01-13
Payer: OTHER GOVERNMENT

## 2025-01-13 VITALS
BODY MASS INDEX: 31.5 KG/M2 | TEMPERATURE: 98.1 F | DIASTOLIC BLOOD PRESSURE: 90 MMHG | HEART RATE: 83 BPM | SYSTOLIC BLOOD PRESSURE: 173 MMHG | HEIGHT: 70 IN | OXYGEN SATURATION: 95 % | RESPIRATION RATE: 18 BRPM | WEIGHT: 220 LBS

## 2025-01-13 DIAGNOSIS — J20.9 ACUTE BRONCHITIS, UNSPECIFIED ORGANISM: Primary | ICD-10-CM

## 2025-01-13 LAB
FLUAV RNA RESP QL NAA+PROBE: NOT DETECTED
FLUBV RNA RESP QL NAA+PROBE: NOT DETECTED
RSV RNA RESP QL NAA+PROBE: NOT DETECTED
SARS-COV-2 RNA RESP QL NAA+PROBE: NOT DETECTED

## 2025-01-13 PROCEDURE — 2500000004 HC RX 250 GENERAL PHARMACY W/ HCPCS (ALT 636 FOR OP/ED): Performed by: PHYSICIAN ASSISTANT

## 2025-01-13 PROCEDURE — 2500000001 HC RX 250 WO HCPCS SELF ADMINISTERED DRUGS (ALT 637 FOR MEDICARE OP): Performed by: PHYSICIAN ASSISTANT

## 2025-01-13 PROCEDURE — 71045 X-RAY EXAM CHEST 1 VIEW: CPT | Performed by: RADIOLOGY

## 2025-01-13 PROCEDURE — 87637 SARSCOV2&INF A&B&RSV AMP PRB: CPT | Performed by: PHYSICIAN ASSISTANT

## 2025-01-13 PROCEDURE — 71045 X-RAY EXAM CHEST 1 VIEW: CPT

## 2025-01-13 PROCEDURE — 94640 AIRWAY INHALATION TREATMENT: CPT | Mod: 59

## 2025-01-13 PROCEDURE — 99284 EMERGENCY DEPT VISIT MOD MDM: CPT

## 2025-01-13 PROCEDURE — 2500000002 HC RX 250 W HCPCS SELF ADMINISTERED DRUGS (ALT 637 FOR MEDICARE OP, ALT 636 FOR OP/ED): Performed by: PHYSICIAN ASSISTANT

## 2025-01-13 RX ORDER — PREDNISONE 50 MG/1
50 TABLET ORAL DAILY
Qty: 5 TABLET | Refills: 0 | Status: SHIPPED | OUTPATIENT
Start: 2025-01-13 | End: 2025-01-18

## 2025-01-13 RX ORDER — IPRATROPIUM BROMIDE AND ALBUTEROL SULFATE 2.5; .5 MG/3ML; MG/3ML
3 SOLUTION RESPIRATORY (INHALATION) EVERY 20 MIN
Status: DISCONTINUED | OUTPATIENT
Start: 2025-01-13 | End: 2025-01-13

## 2025-01-13 RX ORDER — AZITHROMYCIN 250 MG/1
250 TABLET, FILM COATED ORAL DAILY
Qty: 4 TABLET | Refills: 0 | Status: SHIPPED | OUTPATIENT
Start: 2025-01-14 | End: 2025-01-18

## 2025-01-13 RX ORDER — AZITHROMYCIN 250 MG/1
500 TABLET, FILM COATED ORAL ONCE
Status: COMPLETED | OUTPATIENT
Start: 2025-01-13 | End: 2025-01-13

## 2025-01-13 RX ORDER — ALBUTEROL SULFATE 90 UG/1
2 INHALANT RESPIRATORY (INHALATION) ONCE
Status: COMPLETED | OUTPATIENT
Start: 2025-01-13 | End: 2025-01-13

## 2025-01-13 RX ORDER — ALBUTEROL SULFATE 90 UG/1
1-2 INHALANT RESPIRATORY (INHALATION) EVERY 6 HOURS PRN
Qty: 18 G | Refills: 0 | Status: SHIPPED | OUTPATIENT
Start: 2025-01-13 | End: 2025-02-12

## 2025-01-13 RX ORDER — PREDNISONE 20 MG/1
60 TABLET ORAL ONCE
Status: COMPLETED | OUTPATIENT
Start: 2025-01-13 | End: 2025-01-13

## 2025-01-13 RX ORDER — GUAIFENESIN 600 MG/1
600 TABLET, EXTENDED RELEASE ORAL 2 TIMES DAILY
Qty: 14 TABLET | Refills: 0 | Status: SHIPPED | OUTPATIENT
Start: 2025-01-13 | End: 2025-01-20

## 2025-01-13 RX ADMIN — ALBUTEROL SULFATE 2 PUFF: 90 AEROSOL, METERED RESPIRATORY (INHALATION) at 05:13

## 2025-01-13 RX ADMIN — PREDNISONE 60 MG: 20 TABLET ORAL at 05:13

## 2025-01-13 RX ADMIN — AZITHROMYCIN DIHYDRATE 500 MG: 250 TABLET, FILM COATED ORAL at 05:16

## 2025-01-13 ASSESSMENT — LIFESTYLE VARIABLES
EVER FELT BAD OR GUILTY ABOUT YOUR DRINKING: NO
HAVE YOU EVER FELT YOU SHOULD CUT DOWN ON YOUR DRINKING: NO
EVER HAD A DRINK FIRST THING IN THE MORNING TO STEADY YOUR NERVES TO GET RID OF A HANGOVER: NO
HAVE PEOPLE ANNOYED YOU BY CRITICIZING YOUR DRINKING: NO
TOTAL SCORE: 0

## 2025-01-13 ASSESSMENT — PAIN DESCRIPTION - LOCATION: LOCATION: CHEST

## 2025-01-13 ASSESSMENT — PAIN DESCRIPTION - ORIENTATION: ORIENTATION: LEFT

## 2025-01-13 ASSESSMENT — PAIN - FUNCTIONAL ASSESSMENT
PAIN_FUNCTIONAL_ASSESSMENT: 0-10
PAIN_FUNCTIONAL_ASSESSMENT: 0-10

## 2025-01-13 ASSESSMENT — PAIN SCALES - GENERAL: PAINLEVEL_OUTOF10: 4

## 2025-01-13 ASSESSMENT — PAIN DESCRIPTION - PAIN TYPE: TYPE: ACUTE PAIN

## 2025-01-13 NOTE — ED PROVIDER NOTES
HPI   Chief Complaint   Patient presents with    Cough     My chest hurts when I cough.         This is a 54-year-old gentleman who presents emergency room with chief complaints of chest congestion cough chills and bodyaches for the past 3 days.  He denies any hemoptysis, fever, night sweats, weight loss.  His cough is been getting more productive for the past 24 hours.  He denies any abdominal pain, nausea vomiting or diarrhea.  Past medical history of Acevedo's esophagus, history of partial seizures, esophageal disorders, tobacco use, hypertension, bilateral hearing loss, GERD, headaches.  Allergies listed as penicillin elbow dander.      History provided by:  Patient and medical records          Patient History   Past Medical History:   Diagnosis Date    Personal history of other diseases of the circulatory system 01/29/2021    History of hypertension    Personal history of other diseases of the digestive system 01/29/2021    History of Acevedo's esophagus    Personal history of other specified conditions 01/29/2021    History of seizure     Past Surgical History:   Procedure Laterality Date    OTHER SURGICAL HISTORY  01/29/2021    Lamar tooth extraction    OTHER SURGICAL HISTORY  01/29/2021    Shoulder surgery    OTHER SURGICAL HISTORY  01/29/2021    Facial surgery     No family history on file.  Social History     Tobacco Use    Smoking status: Every Day     Types: Cigarettes    Smokeless tobacco: Not on file   Substance Use Topics    Alcohol use: Defer    Drug use: Defer       Physical Exam   ED Triage Vitals [01/13/25 0451]   Temperature Heart Rate Respirations BP   36.2 °C (97.2 °F) 85 17 (!) 179/102      Pulse Ox Temp Source Heart Rate Source Patient Position   95 % Temporal Monitor Sitting      BP Location FiO2 (%)     Right arm --       Physical Exam  Vitals and nursing note reviewed.   Constitutional:       General: He is awake.      Appearance: Normal appearance. He is well-developed, well-groomed and  normal weight.   HENT:      Head: Normocephalic and atraumatic.      Right Ear: Tympanic membrane, ear canal and external ear normal.      Left Ear: Tympanic membrane, ear canal and external ear normal.      Nose: Nose normal.      Mouth/Throat:      Mouth: Mucous membranes are moist.      Pharynx: Oropharynx is clear.   Eyes:      Extraocular Movements: Extraocular movements intact.      Conjunctiva/sclera: Conjunctivae normal.      Pupils: Pupils are equal, round, and reactive to light.   Cardiovascular:      Rate and Rhythm: Normal rate and regular rhythm.      Pulses: Normal pulses.      Heart sounds: Normal heart sounds.   Pulmonary:      Effort: Pulmonary effort is normal.      Breath sounds: Wheezing present. No rhonchi or rales.   Abdominal:      General: Abdomen is flat. Bowel sounds are normal.      Palpations: Abdomen is soft. There is no mass.      Tenderness: There is no abdominal tenderness. There is no guarding.   Musculoskeletal:         General: No swelling or tenderness. Normal range of motion.      Cervical back: Normal range of motion and neck supple. No rigidity.   Lymphadenopathy:      Cervical: No cervical adenopathy.   Skin:     General: Skin is warm and dry.      Capillary Refill: Capillary refill takes less than 2 seconds.      Findings: No rash.   Neurological:      General: No focal deficit present.      Mental Status: He is alert and oriented to person, place, and time. Mental status is at baseline.   Psychiatric:         Mood and Affect: Mood normal.         Behavior: Behavior normal. Behavior is cooperative.         Thought Content: Thought content normal.         Judgment: Judgment normal.           ED Course & MDM   Diagnoses as of 01/13/25 0547   Acute bronchitis, unspecified organism                 No data recorded     Hieu Coma Scale Score: 15 (01/13/25 0455 : Kylah Dangelo RN)                           Medical Decision Making  /102 heart rate 85 respirations 17 pulse  ox is 95% on room air temperature is 36.2    Care turned over to Sisi Mujica NP        Procedure  Procedures     Prince Hoover PA-C  01/13/25 0548

## 2025-01-13 NOTE — PROGRESS NOTES
Emergency Medicine Transition of Care Note.    I received Nick Blakely in signout from NATALIIA Bear.  Please see the previous ED provider note for all HPI, PE and MDM up to the time of signout at 0600. This is in addition to the primary record.    In brief Nick Blakely is an 54 y.o. male presenting for   Chief Complaint   Patient presents with    Cough     My chest hurts when I cough.       At the time of signout we were awaiting: Diagnostic results    Diagnoses as of 01/13/25 0602   Acute bronchitis, unspecified organism       Medical Decision Making    Patient micro swabs are negative, chest x-ray is without evidence of pneumonia.    Patients rations are even unlabored, skin is warm and dry no diaphoresis.  Patient is neurologically intact on any focal deficits.  I did update patient on his results.  We discussed that he will be discharged home with prescriptions for Zithromax, albuterol, and prednisone.  Patient educated on the use these medications.  All patient's questions and concerns were addressed.  Discharge.  Patient discharged home in stable condition.    Final diagnoses:   [J20.9] Acute bronchitis, unspecified organism           Procedure  Procedures    Sisi Mujica, APRN-CNP